# Patient Record
Sex: MALE | Race: WHITE | NOT HISPANIC OR LATINO | Employment: OTHER | URBAN - NONMETROPOLITAN AREA
[De-identification: names, ages, dates, MRNs, and addresses within clinical notes are randomized per-mention and may not be internally consistent; named-entity substitution may affect disease eponyms.]

---

## 2017-01-12 ENCOUNTER — OFFICE VISIT (OUTPATIENT)
Dept: CARDIOLOGY | Facility: CLINIC | Age: 62
End: 2017-01-12

## 2017-01-12 VITALS
DIASTOLIC BLOOD PRESSURE: 80 MMHG | HEART RATE: 92 BPM | BODY MASS INDEX: 31.64 KG/M2 | SYSTOLIC BLOOD PRESSURE: 148 MMHG | HEIGHT: 71 IN | WEIGHT: 226 LBS

## 2017-01-12 DIAGNOSIS — R55 SYNCOPE AND COLLAPSE: Primary | ICD-10-CM

## 2017-01-12 DIAGNOSIS — I10 ESSENTIAL HYPERTENSION: ICD-10-CM

## 2017-01-12 DIAGNOSIS — E78.2 MIXED HYPERLIPIDEMIA: ICD-10-CM

## 2017-01-12 PROCEDURE — 99213 OFFICE O/P EST LOW 20 MIN: CPT | Performed by: INTERNAL MEDICINE

## 2017-01-12 PROCEDURE — 93000 ELECTROCARDIOGRAM COMPLETE: CPT | Performed by: INTERNAL MEDICINE

## 2017-01-12 NOTE — MR AVS SNAPSHOT
Hema VY Gaspar   1/12/2017 3:15 PM   Office Visit    Dept Phone:  [phone]   Encounter #:  [id number]    Provider:  Angie Vogel MD   Department:  [facility] CARDIOLOGY                Your Full Care Plan              Today's Medication Changes          These changes are accurate as of: 1/12/17  3:56 PM.  If you have any questions, ask your nurse or doctor.               Medication(s)that have changed:     aspirin 81 MG EC tablet   Take 81 mg by mouth Daily.   What changed:  Another medication with the same name was removed. Continue taking this medication, and follow the directions you see here.   Changed by:  Marilou Baxter CSA         Stop taking medication(s)listed here:     fexofenadine 180 MG tablet   Commonly known as:  ALLEGRA   Stopped by:  Angie Vogel MD           MAGNESIUM OXIDE PO   Stopped by:  Vee Cornell RN                      Your Updated Medication List          This list is accurate as of: 1/12/17  3:56 PM.  Always use your most recent med list.                aspirin 81 MG EC tablet       benazepril 20 MG tablet   Commonly known as:  LOTENSIN       HM LANCETS MICRO THIN 33G misc       magnesium oxide 400 (241.3 MG) MG tablet tablet   Commonly known as:  MAGOX       metFORMIN 1000 MG tablet   Commonly known as:  GLUCOPHAGE       metoprolol succinate XL 25 MG 24 hr tablet   Commonly known as:  TOPROL-XL       midodrine 5 MG tablet   Commonly known as:  PROAMATINE               You Were Diagnosed With        Codes Comments    Syncope and collapse    -  Primary ICD-10-CM: R55  ICD-9-CM: 780.2     Essential hypertension     ICD-10-CM: I10  ICD-9-CM: 401.9     Mixed hyperlipidemia     ICD-10-CM: E78.2  ICD-9-CM: 272.2       Instructions     None    Patient Instructions History      Upcoming Appointments     Visit Type Date Time Department    OFFICE VISIT 1/12/2017  3:15 PM [facility]    OFFICE VISIT 7/13/2017   "2:45 PM [facility]      [employer] Signup     [facility] [employer] allows you to send messages to your doctor, view your test results, renew your prescriptions, schedule appointments, and more. To sign up, go to [employer] and click on the Sign Up Now link in the New User? box. Enter your [employer] Activation Code exactly as it appears below along with the last four digits of your Social Security Number and your Date of Birth () to complete the sign-up process. If you do not sign up before the expiration date, you must request a new code.    [employer] Activation Code: [email or web address]  Expires: 2017  3:56 PM    If you have questions, you can email [email or web address] or call [phone] to talk to our [employer] staff. Remember, [employer] is NOT to be used for urgent needs. For medical emergencies, dial 911.               Other Info from Your Visit           Your Appointments     2017  2:45 PM CDT   Office Visit with Angie Vogel MD   [facility] (--)    [address]  [facility] [address] [phone]           Arrive 15 minutes prior to appointment.              Allergies     Codeine        Vital Signs     Blood Pressure Pulse Height Weight Body Mass Index Smoking Status    148/80 92 71\" (180.3 cm) 226 lb (103 kg) 31.52 kg/m2 Never Smoker      Problems and Diagnoses Noted     High blood pressure    Mixed hyperlipidemia    Fainting        "

## 2017-01-13 NOTE — PROGRESS NOTES
Hema Gaspar  61 y.o. male    01/12/2017  1. Syncope and collapse    2. Essential hypertension    3. Mixed hyperlipidemia        History of Present Illness    The Cunningham is here for follow-up of his above stated problems.  From a symptom standpoint is done quite well and has not had any reoccurrence of dizziness or syncope.  He is able to perform his activities of daily living without any restrictions.  No chest pain or shortness of breath was reported.  Blood pressure was 130/80 mmHg when I checked it.  He has been compliant with his medications.  He continues to drink more than 60 ounces of fluids daily.        SUBJECTIVE    Allergies   Allergen Reactions   • Codeine          Past Medical History   Diagnosis Date   • Abnormal EKG    • Bradycardia    • Diabetes mellitus    • Hyperlipidemia    • Hypertension    • Hypotension    • Precordial pain    • Syncope and collapse          Past Surgical History   Procedure Laterality Date   • Tonsillectomy     • Vasectomy           History reviewed. No pertinent family history.      Social History     Social History   • Marital status:      Spouse name: N/A   • Number of children: N/A   • Years of education: N/A     Occupational History   • Not on file.     Social History Main Topics   • Smoking status: Never Smoker   • Smokeless tobacco: Never Used   • Alcohol use No   • Drug use: No   • Sexual activity: Defer     Other Topics Concern   • Not on file     Social History Narrative         Current Outpatient Prescriptions   Medication Sig Dispense Refill   • aspirin 81 MG EC tablet Take 81 mg by mouth Daily.     • benazepril (LOTENSIN) 20 MG tablet Take 20 mg by mouth Daily.     • HM LANCETS MICRO THIN 33G misc      • magnesium oxide (MAGOX) 400 (241.3 MG) MG tablet tablet Take 400 mg by mouth Daily.     • metFORMIN (GLUCOPHAGE) 1000 MG tablet Take 1,000 mg by mouth 2 (Two) Times a Day With Meals.     • metoprolol succinate XL (TOPROL-XL) 25 MG 24 hr tablet  "Take 25 mg by mouth Daily.     • midodrine (PROAMATINE) 5 MG tablet Take 5 mg by mouth 3 (Three) Times a Day.       No current facility-administered medications for this visit.          OBJECTIVE    Visit Vitals   • /80   • Pulse 92   • Ht 71\" (180.3 cm)   • Wt 226 lb (103 kg)   • BMI 31.52 kg/m2           Review of Systems     Constitutional:  Denies recent weight loss, weight gain, fever or chills, no change in exercise tolerance     HENT:  Denies any hearing loss, epistaxis, hoarseness, or difficulty speaking.     Eyes: Wears eyeglasses or contact lenses     Respiratory:  Denies dyspnea with exertion,no cough, wheezing, or hemoptysis.     Cardiovascular: Negative for palpations, chest pain, orthopnea, PND, peripheral edema, syncope, or claudication.     Gastrointestinal:  Denies change in bowel habits, dyspepsia, ulcer disease, hematochezia, or melena.     Endocrine: Negative for cold intolerance, heat intolerance, polydipsia, polyphagia and polyuria. Denies any history of weight change, heat/cold intolerance, polydipsia, polyuria     Genitourinary: Negative.      Musculoskeletal: Denies any history of arthritic symptoms or back problems     Skin:  Denies any change in hair or nails, rashes, or skin lesions.     Allergic/Immunologic: Negative.  Negative for environmental allergies, food allergies and immunocompromised state.     Neurological:  Denies any history of recurrent headaches, strokes, TIA, or seizure disorder.     Hematological: Denies any food allergies, seasonal allergies, bleeding disorders, or lymphadenopathy.     Psychiatric/Behavioral: Denies any history of depression, substance abuse, or change in cognitive function.         Physical Exam     Constitutional: Cooperative, alert and oriented, well-developed, well-nourished, in no acute distress.     HENT:   Head: Normocephalic, normal hair patterns, no masses or tenderness.  Ears, Nose, and Throat: No gross abnormalities. No pallor or " cyanosis. Dentition good.   Eyes: EOMS intact, PERRL, conjunctivae and lids unremarkable. Fundoscopic exam and visual fields not performed.   Neck: No palpable masses or adenopathy, no thyromegaly, no JVD, carotid pulses are full and equal bilaterally and without  Bruits.     Cardiovascular: Regular rhythm, S1 and S2 normal, no S3 or S4. Apical impulse not displaced. No murmurs, gallops, or rubs detected.     Pulmonary/Chest: Chest: normal symmetry, no tenderness to palpation, normal respiratory excursion, no intercostal retraction, no use of accessory muscles.            Pulmonary: Normal breath sounds. No rales or ronchi.    Abdominal: Abdomen soft, bowel sounds normoactive, no masses, no hepatosplenomegaly, non-tender, no bruits.     Musculoskeletal: No deformities, clubbing, cyanosis, erythema, or edema observed. There are no spinal abnormalities noted. Normal muscle strength and tone. Pulses full and equal in all extremities, no bruits auscultated.     Neurological: No gross motor or sensory deficits noted, affect appropriate, oriented to time, person, place.     Skin: Warm and dry to the touch, no apparent skin lesions or masses noted.     Psychiatric: He has a normal mood and affect. His behavior is normal. Judgment and thought content normal.       Below is the interpretation for EKG done on 1/12/2017.    ECG 12 Lead  Date/Time: 1/14/2017 11:28 AM  Performed by: RAINE ROBLES  Authorized by: RAINE ROBLES   Comparison: not compared with previous ECG   Rhythm: sinus rhythm  Rate: normal  Conduction: right bundle branch block  QRS axis: normal  Comments: EKG showed sinus rhythm heart rate of 93 bpm.  Right bundle branch block was noted.  Nonspecific ST-T changes were noted.  QRS duration was 126 ms and QTc interval 447 ms.  AK interval was 182 ms.  KG was performed on 1/12/2017              Lab Results   Component Value Date    WBC 9.2 01/02/2017    HGB 15.0 01/02/2017    HCT 43.1  01/02/2017    MCV 84.0 01/02/2017     01/02/2017     Lab Results   Component Value Date    GLUCOSE 155 (H) 01/02/2017    BUN 15 01/02/2017    CREATININE 0.8 01/02/2017    CO2 28 01/02/2017    CALCIUM 9.2 01/02/2017    ALBUMIN 4.3 01/02/2017    AST 29 01/02/2017    ALT 49 01/02/2017     No results found for: CHOL  No results found for: TRIG  No results found for: HDL  No results found for: LDLCALC  No results found for: LDL  No results found for: HDLLDLRATIO  No components found for: CHOLHDL  No results found for: HGBA1C  Lab Results   Component Value Date    TSH 2.17 01/02/2017           ASSESSMENT AND PLAN  Mr. Gaspar is clinically stable with no reoccurrence of dizziness or syncope.  I've continued his antiplatelet therapy with aspirin antihypertensive therapy with benazepril and Toprol-XL and midodrine has been continued.  Mr. Gaspar has not had any dizzy spells or syncopal episodes for almost a year now and I believe that he will be able to drive safely.    Diagnoses and all orders for this visit:    Syncope and collapse    Essential hypertension    Mixed hyperlipidemia        Angie Vogel MD  1/13/2017  2:26 PM

## 2017-07-13 ENCOUNTER — OFFICE VISIT (OUTPATIENT)
Dept: CARDIOLOGY | Facility: CLINIC | Age: 62
End: 2017-07-13

## 2017-07-13 VITALS
HEART RATE: 80 BPM | SYSTOLIC BLOOD PRESSURE: 130 MMHG | WEIGHT: 230 LBS | HEIGHT: 72 IN | BODY MASS INDEX: 31.15 KG/M2 | DIASTOLIC BLOOD PRESSURE: 80 MMHG

## 2017-07-13 DIAGNOSIS — R55 SYNCOPE AND COLLAPSE: Primary | ICD-10-CM

## 2017-07-13 DIAGNOSIS — E78.2 MIXED HYPERLIPIDEMIA: ICD-10-CM

## 2017-07-13 DIAGNOSIS — R00.1 BRADYCARDIA: ICD-10-CM

## 2017-07-13 DIAGNOSIS — I10 ESSENTIAL HYPERTENSION: ICD-10-CM

## 2017-07-13 PROCEDURE — 99213 OFFICE O/P EST LOW 20 MIN: CPT | Performed by: INTERNAL MEDICINE

## 2017-07-13 RX ORDER — MELOXICAM 15 MG/1
15 TABLET ORAL
COMMUNITY
Start: 2017-06-19 | End: 2018-06-15

## 2017-07-13 NOTE — PROGRESS NOTES
Hema Gaspar  61 y.o. male    07/13/2017  1. Syncope and collapse    2. Bradycardia    3. Essential hypertension    4. Mixed hyperlipidemia        History of Present Illness    Mr. Gaspar is here for follow-up of his above stated problems.  From a symptom standpoint he is done well and denied any chest pain, shortness of breath, palpitation, dizziness or syncope.  He has been drinking more than a liter of water every day.  Blood pressure was in the normal range.  He had lab work done by  yesterday and LDL was 108 and HDL was 53.  Look lites within the normal range and BUNs was 22.  I have encouraged him to continue drinking large amount of fluids.        SUBJECTIVE    Allergies   Allergen Reactions   • Codeine          Past Medical History:   Diagnosis Date   • Abnormal EKG    • Bradycardia    • Diabetes mellitus    • Hyperlipidemia    • Hypertension    • Hypotension    • Precordial pain    • Syncope and collapse          Past Surgical History:   Procedure Laterality Date   • TONSILLECTOMY     • VASECTOMY           No family history on file.      Social History     Social History   • Marital status:      Spouse name: N/A   • Number of children: N/A   • Years of education: N/A     Occupational History   • Not on file.     Social History Main Topics   • Smoking status: Never Smoker   • Smokeless tobacco: Never Used   • Alcohol use No   • Drug use: No   • Sexual activity: Defer     Other Topics Concern   • Not on file     Social History Narrative         Current Outpatient Prescriptions   Medication Sig Dispense Refill   • meloxicam (MOBIC) 15 MG tablet Take 15 mg by mouth.     • aspirin 81 MG EC tablet Take 81 mg by mouth Daily.     • benazepril (LOTENSIN) 20 MG tablet Take 20 mg by mouth Daily.     • HM LANCETS MICRO THIN 33G misc      • magnesium oxide (MAGOX) 400 (241.3 MG) MG tablet tablet Take 400 mg by mouth Daily.     • metFORMIN (GLUCOPHAGE) 1000 MG tablet Take 1,000 mg by mouth 2  "(Two) Times a Day With Meals.     • metoprolol succinate XL (TOPROL-XL) 25 MG 24 hr tablet Take 25 mg by mouth Daily.     • midodrine (PROAMATINE) 5 MG tablet Take 5 mg by mouth 3 (Three) Times a Day.       No current facility-administered medications for this visit.          OBJECTIVE    /80  Pulse 80  Ht 72\" (182.9 cm)  Wt 230 lb (104 kg)  BMI 31.19 kg/m2        Review of Systems     Constitutional:  Denies recent weight loss, weight gain, fever or chills, no change in exercise tolerance     HENT:  Denies any hearing loss, epistaxis, hoarseness, or difficulty speaking.     Eyes: Wears eyeglasses or contact lenses     Respiratory:  Denies dyspnea with exertion,no cough, wheezing, or hemoptysis.     Cardiovascular: Negative for palpations, chest pain, orthopnea, PND, peripheral edema, syncope, or claudication.     Gastrointestinal:  Denies change in bowel habits, dyspepsia, ulcer disease, hematochezia, or melena.     Endocrine: Negative for cold intolerance, heat intolerance, polydipsia, polyphagia and polyuria. Denies any history of weight change, heat/cold intolerance, polydipsia, polyuria     Genitourinary: Negative.      Musculoskeletal: Denies any history of arthritic symptoms or back problems     Skin:  Denies any change in hair or nails, rashes, or skin lesions.     Allergic/Immunologic: Negative.  Negative for environmental allergies, food allergies and immunocompromised state.     Neurological:  Denies any history of recurrent headaches, strokes, TIA, or seizure disorder.     Hematological: Denies any food allergies, seasonal allergies, bleeding disorders, or lymphadenopathy.     Psychiatric/Behavioral: Denies any history of depression, substance abuse, or change in cognitive function.         Physical Exam     Constitutional: Cooperative, alert and oriented, well-developed, well-nourished, in no acute distress.     HENT:   Head: Normocephalic, normal hair patterns, no masses or tenderness.  Ears, " Nose, and Throat: No gross abnormalities. No pallor or cyanosis.   Eyes: EOMS intact, PERRL, conjunctivae and lids unremarkable. Fundoscopic exam and visual fields not performed.   Neck: No palpable masses or adenopathy, no thyromegaly, no JVD, carotid pulses are full and equal bilaterally and without  Bruits.     Cardiovascular: Regular rhythm, S1 and S2 normal, no S3 or S4. Apical impulse not displaced. No murmurs, gallops, or rubs detected.     Pulmonary/Chest: Chest: normal symmetry, no tenderness to palpation, normal respiratory excursion, no intercostal retraction, no use of accessory muscles.            Pulmonary: Normal breath sounds. No rales or ronchi.    Abdominal: Abdomen soft, bowel sounds normoactive, no masses, no hepatosplenomegaly, non-tender, no bruits.     Musculoskeletal: No deformities, clubbing, cyanosis, erythema, or edema observed.    Neurological: No gross motor or sensory deficits noted, affect appropriate, oriented to time, person, place.     Skin: Warm and dry to the touch, no apparent skin lesions or masses noted.     Psychiatric: He has a normal mood and affect. His behavior is normal. Judgment and thought content normal.         Procedures      Lab Results   Component Value Date    WBC 9.2 01/02/2017    HGB 15.0 01/02/2017    HCT 43.1 01/02/2017    MCV 84.0 01/02/2017     01/02/2017     Lab Results   Component Value Date    GLUCOSE 155 (H) 01/02/2017    BUN 15 01/02/2017    CREATININE 0.8 01/02/2017    CO2 28 01/02/2017    CALCIUM 9.2 01/02/2017    ALBUMIN 4.3 01/02/2017    AST 29 01/02/2017    ALT 49 01/02/2017     No results found for: CHOL  No results found for: TRIG  No results found for: HDL  No results found for: LDLCALC  No results found for: LDL  No results found for: HDLLDLRATIO  No components found for: CHOLHDL  No results found for: HGBA1C  Lab Results   Component Value Date    TSH 2.17 01/02/2017           ASSESSMENT AND PLAN  Mr. Gaspar is stable with no  evidence of angina, arrhythmia or congestive heart failure.  Antiplatelet therapy with aspirin, antihypertensive therapy with benazepril and metoprolol XL and Midorine 5 mg 3 times a day has been continued.    Diagnoses and all orders for this visit:    Syncope and collapse    Bradycardia    Essential hypertension    Mixed hyperlipidemia        Angie Vogel MD  7/13/2017  3:01 PM

## 2017-08-09 RX ORDER — MIDODRINE HYDROCHLORIDE 5 MG/1
5 TABLET ORAL 3 TIMES DAILY
Qty: 90 TABLET | Refills: 6 | Status: SHIPPED | OUTPATIENT
Start: 2017-08-09 | End: 2017-12-20 | Stop reason: SDUPTHER

## 2017-08-09 NOTE — TELEPHONE ENCOUNTER
Refill Request came in by fax from   [facility]/pharmacy #[id number] - [place], [place] - [address] [place] [address] - [phone]  - [phone] [address] [address]  Phone: [phone] Fax: [phone]   Refill sent to pharmacy via e-scribe.

## 2017-10-20 RX ORDER — METOPROLOL SUCCINATE 25 MG/1
25 TABLET, EXTENDED RELEASE ORAL DAILY
Qty: 90 TABLET | Refills: 1 | Status: SHIPPED | OUTPATIENT
Start: 2017-10-20 | End: 2018-01-19 | Stop reason: SDUPTHER

## 2017-12-20 RX ORDER — MIDODRINE HYDROCHLORIDE 5 MG/1
5 TABLET ORAL 3 TIMES DAILY
Qty: 90 TABLET | Refills: 6 | Status: SHIPPED | OUTPATIENT
Start: 2017-12-20 | End: 2018-07-26 | Stop reason: SDUPTHER

## 2017-12-20 NOTE — TELEPHONE ENCOUNTER
Refill Request came in by fax from   [facility]/pharmacy #[id number] - [place], [place] - [address] [place] [address] - [phone]  - [phone] [address] [address]  Phone: [phone] Fax: [phone]   Refill sent to pharmacy via e-scribe.

## 2018-01-19 ENCOUNTER — OFFICE VISIT (OUTPATIENT)
Dept: CARDIOLOGY | Facility: CLINIC | Age: 63
End: 2018-01-19

## 2018-01-19 VITALS
HEIGHT: 71 IN | WEIGHT: 227 LBS | SYSTOLIC BLOOD PRESSURE: 146 MMHG | BODY MASS INDEX: 31.78 KG/M2 | HEART RATE: 76 BPM | DIASTOLIC BLOOD PRESSURE: 84 MMHG

## 2018-01-19 DIAGNOSIS — R55 SYNCOPE AND COLLAPSE: ICD-10-CM

## 2018-01-19 DIAGNOSIS — E78.2 MIXED HYPERLIPIDEMIA: ICD-10-CM

## 2018-01-19 DIAGNOSIS — I10 ESSENTIAL HYPERTENSION: Primary | ICD-10-CM

## 2018-01-19 PROCEDURE — 99213 OFFICE O/P EST LOW 20 MIN: CPT | Performed by: INTERNAL MEDICINE

## 2018-01-19 RX ORDER — METOPROLOL SUCCINATE 25 MG/1
25 TABLET, EXTENDED RELEASE ORAL DAILY
Qty: 90 TABLET | Refills: 1 | Status: SHIPPED | OUTPATIENT
Start: 2018-01-19 | End: 2021-11-15 | Stop reason: SDUPTHER

## 2018-01-19 NOTE — PROGRESS NOTES
Hema Gaspar  62 y.o. male    01/19/2018  1. Essential hypertension    2. Syncope and collapse    3. Mixed hyperlipidemia        History of Present Illness    Mr. 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  His done well from a symptom standpoint with no reoccurrence of dizziness or syncope.  His been compliant with his medications.  He denied any cardiac symptoms.  His lipid profiles rechecked by Dr. aTylor and LDL was 107.        SUBJECTIVE    Allergies   Allergen Reactions   • Codeine          Past Medical History:   Diagnosis Date   • Abnormal EKG    • Bradycardia    • Diabetes mellitus    • Hyperlipidemia    • Hypertension    • Hypotension    • Precordial pain    • Syncope and collapse          Past Surgical History:   Procedure Laterality Date   • TONSILLECTOMY     • VASECTOMY           Family History   Problem Relation Age of Onset   • No Known Problems Mother    • Heart disease Father          Social History     Social History   • Marital status:      Spouse name: N/A   • Number of children: N/A   • Years of education: N/A     Occupational History   • Not on file.     Social History Main Topics   • Smoking status: Never Smoker   • Smokeless tobacco: Never Used   • Alcohol use No   • Drug use: No   • Sexual activity: Defer     Other Topics Concern   • Not on file     Social History Narrative         Current Outpatient Prescriptions   Medication Sig Dispense Refill   • aspirin 81 MG EC tablet Take 81 mg by mouth Daily.     • benazepril (LOTENSIN) 20 MG tablet Take 20 mg by mouth Daily.     • HM LANCETS MICRO THIN 33G misc      • magnesium oxide (MAGOX) 400 (241.3 MG) MG tablet tablet Take 400 mg by mouth Daily.     • meloxicam (MOBIC) 15 MG tablet Take 15 mg by mouth.     • metFORMIN (GLUCOPHAGE) 1000 MG tablet Take 1,000 mg by mouth 2 (Two) Times a Day With Meals.     • metoprolol succinate XL (TOPROL-XL) 25 MG 24 hr tablet Take 1 tablet by mouth Daily. 90 tablet  "1   • midodrine (PROAMATINE) 5 MG tablet Take 1 tablet by mouth 3 (Three) Times a Day. 90 tablet 6     No current facility-administered medications for this visit.          OBJECTIVE    /84  Pulse 76  Ht 180.3 cm (71\")  Wt 103 kg (227 lb)  BMI 31.66 kg/m2        Review of Systems     Constitutional:  Denies recent weight loss, weight gain, fever or chills, no change in exercise tolerance     HENT:  Denies any hearing loss, epistaxis, hoarseness, or difficulty speaking.     Eyes: Wears eyeglasses or contact lenses     Respiratory:  Denies dyspnea with exertion,no cough, wheezing, or hemoptysis.     Cardiovascular: Negative for palpations, chest pain, orthopnea, PND, peripheral edema, syncope, or claudication.     Gastrointestinal:  Denies change in bowel habits, dyspepsia, ulcer disease, hematochezia, or melena.     Endocrine: Negative for cold intolerance, heat intolerance, polydipsia, polyphagia and polyuria. Denies any history of weight change, heat/cold intolerance, polydipsia, polyuria     Genitourinary: Negative.      Musculoskeletal: Denies any history of arthritic symptoms or back problems     Skin:  Denies any change in hair or nails, rashes, or skin lesions.     Allergic/Immunologic: Negative.  Negative for environmental allergies, food allergies and immunocompromised state.     Neurological:  Denies any history of recurrent headaches, strokes, TIA, or seizure disorder.     Hematological: Denies any food allergies, seasonal allergies, bleeding disorders, or lymphadenopathy.     Psychiatric/Behavioral: Denies any history of depression, substance abuse, or change in cognitive function.         Physical Exam     Constitutional: Cooperative, alert and oriented, well-developed, well-nourished, in no acute distress.     HENT:   Head: Normocephalic, normal hair patterns, no masses or tenderness.  Ears, Nose, and Throat: No gross abnormalities. No pallor or cyanosis. Dentition good.   Eyes: EOMS intact, " PERRL, conjunctivae and lids unremarkable. Fundoscopic exam and visual fields not performed.   Neck: No palpable masses or adenopathy, no thyromegaly, no JVD, carotid pulses are full and equal bilaterally and without  Bruits.     Cardiovascular: Regular rhythm, S1 and S2 normal, no S3 or S4. Apical impulse not displaced. No murmurs, gallops, or rubs detected.     Pulmonary/Chest: Chest: normal symmetry, no tenderness to palpation, normal respiratory excursion, no intercostal retraction, no use of accessory muscles.            Pulmonary: Normal breath sounds. No rales or ronchi.    Abdominal: Abdomen soft, bowel sounds normoactive, no masses, no hepatosplenomegaly, non-tender, no bruits.     Musculoskeletal: No deformities, clubbing, cyanosis, erythema, or edema observed. There are no spinal abnormalities noted. Normal muscle strength and tone. Pulses full and equal in all extremities, no bruits auscultated.     Neurological: No gross motor or sensory deficits noted, affect appropriate, oriented to time, person, place.     Skin: Warm and dry to the touch, no apparent skin lesions or masses noted.     Psychiatric: He has a normal mood and affect. His behavior is normal. Judgment and thought content normal.         Procedures      Lab Results   Component Value Date    WBC 9.2 01/02/2017    HGB 15.0 01/02/2017    HCT 43.1 01/02/2017    MCV 84.0 01/02/2017     01/02/2017     Lab Results   Component Value Date    GLUCOSE 155 (H) 01/02/2017    BUN 15 01/02/2017    CREATININE 0.8 01/02/2017    CO2 28 01/02/2017    CALCIUM 9.2 01/02/2017    ALBUMIN 4.3 01/02/2017    AST 29 01/02/2017    ALT 49 01/02/2017     No results found for: CHOL  No results found for: TRIG  No results found for: HDL  No results found for: LDLCALC  No results found for: LDL  No results found for: HDLLDLRATIO  No components found for: CHOLHDL  No results found for: HGBA1C  Lab Results   Component Value Date    TSH 2.17 01/02/2017            ASSESSMENT AND PLAN    Mr. Gaspar is stable with no evidence of dizziness or syncope.  Antiplatelet therapy with aspirin, antihypertensive therapy with benazepril and low-dose metoprolol succinate and mid midodrine have been continued.    Hema was seen today for follow-up.    Diagnoses and all orders for this visit:    Essential hypertension    Syncope and collapse    Mixed hyperlipidemia        Angie Vogel MD  1/19/2018  8:19 AM

## 2018-02-21 ENCOUNTER — HOSPITAL ENCOUNTER (EMERGENCY)
Facility: HOSPITAL | Age: 63
Discharge: HOME | End: 2018-02-21
Attending: FAMILY MEDICINE | Admitting: FAMILY MEDICINE

## 2018-02-21 ENCOUNTER — APPOINTMENT (OUTPATIENT)
Dept: CT IMAGING | Facility: HOSPITAL | Age: 63
End: 2018-02-21

## 2018-02-21 VITALS
RESPIRATION RATE: 18 BRPM | TEMPERATURE: 97.6 F | HEIGHT: 72 IN | BODY MASS INDEX: 28.99 KG/M2 | SYSTOLIC BLOOD PRESSURE: 119 MMHG | WEIGHT: 214 LBS | DIASTOLIC BLOOD PRESSURE: 72 MMHG | OXYGEN SATURATION: 96 % | HEART RATE: 72 BPM

## 2018-02-21 DIAGNOSIS — R10.32 LEFT LOWER QUADRANT PAIN: Primary | ICD-10-CM

## 2018-02-21 LAB
ALBUMIN SERPL-MCNC: 4.5 G/DL (ref 3.4–4.8)
ALBUMIN/GLOB SERPL: 1.6 G/DL (ref 1.1–1.8)
ALP SERPL-CCNC: 39 U/L (ref 38–126)
ALT SERPL W P-5'-P-CCNC: 39 U/L (ref 21–72)
AMYLASE SERPL-CCNC: 61 U/L (ref 50–130)
ANION GAP SERPL CALCULATED.3IONS-SCNC: 14 MMOL/L (ref 5–15)
AST SERPL-CCNC: 30 U/L (ref 17–59)
BASOPHILS # BLD AUTO: 0.02 10*3/MM3 (ref 0–0.2)
BASOPHILS NFR BLD AUTO: 0.2 % (ref 0–2)
BILIRUB SERPL-MCNC: 0.6 MG/DL (ref 0.2–1.3)
BILIRUB UR QL STRIP: NEGATIVE
BUN BLD-MCNC: 18 MG/DL (ref 7–21)
BUN/CREAT SERPL: 20 (ref 7–25)
CALCIUM SPEC-SCNC: 9.2 MG/DL (ref 8.4–10.2)
CHLORIDE SERPL-SCNC: 102 MMOL/L (ref 95–110)
CLARITY UR: ABNORMAL
CO2 SERPL-SCNC: 25 MMOL/L (ref 22–31)
COLOR UR: YELLOW
CREAT BLD-MCNC: 0.9 MG/DL (ref 0.7–1.3)
DEPRECATED RDW RBC AUTO: 40.8 FL (ref 35.1–43.9)
EOSINOPHIL # BLD AUTO: 0.12 10*3/MM3 (ref 0–0.7)
EOSINOPHIL NFR BLD AUTO: 1.4 % (ref 0–7)
ERYTHROCYTE [DISTWIDTH] IN BLOOD BY AUTOMATED COUNT: 13.5 % (ref 11.5–14.5)
GFR SERPL CREATININE-BSD FRML MDRD: 86 ML/MIN/1.73 (ref 60–113)
GLOBULIN UR ELPH-MCNC: 2.9 GM/DL (ref 2.3–3.5)
GLUCOSE BLD-MCNC: 153 MG/DL (ref 60–100)
GLUCOSE UR STRIP-MCNC: NEGATIVE MG/DL
HCT VFR BLD AUTO: 38.2 % (ref 39–49)
HGB BLD-MCNC: 13.2 G/DL (ref 13.7–17.3)
HGB UR QL STRIP.AUTO: NEGATIVE
IMM GRANULOCYTES # BLD: 0.05 10*3/MM3 (ref 0–0.02)
IMM GRANULOCYTES NFR BLD: 0.6 % (ref 0–0.5)
KETONES UR QL STRIP: ABNORMAL
LEUKOCYTE ESTERASE UR QL STRIP.AUTO: NEGATIVE
LIPASE SERPL-CCNC: 43 U/L (ref 23–300)
LYMPHOCYTES # BLD AUTO: 1.61 10*3/MM3 (ref 0.6–4.2)
LYMPHOCYTES NFR BLD AUTO: 18.8 % (ref 10–50)
MCH RBC QN AUTO: 29.2 PG (ref 26.5–34)
MCHC RBC AUTO-ENTMCNC: 34.6 G/DL (ref 31.5–36.3)
MCV RBC AUTO: 84.5 FL (ref 80–98)
MONOCYTES # BLD AUTO: 0.81 10*3/MM3 (ref 0–0.9)
MONOCYTES NFR BLD AUTO: 9.5 % (ref 0–12)
NEUTROPHILS # BLD AUTO: 5.94 10*3/MM3 (ref 2–8.6)
NEUTROPHILS NFR BLD AUTO: 69.5 % (ref 37–80)
NITRITE UR QL STRIP: NEGATIVE
PH UR STRIP.AUTO: 5.5 [PH] (ref 5–9)
PLATELET # BLD AUTO: 183 10*3/MM3 (ref 150–450)
PMV BLD AUTO: 10 FL (ref 8–12)
POTASSIUM BLD-SCNC: 3.9 MMOL/L (ref 3.5–5.1)
PROT SERPL-MCNC: 7.4 G/DL (ref 6.3–8.6)
PROT UR QL STRIP: NEGATIVE
RBC # BLD AUTO: 4.52 10*6/MM3 (ref 4.37–5.74)
SODIUM BLD-SCNC: 141 MMOL/L (ref 137–145)
SP GR UR STRIP: 1.02 (ref 1–1.03)
UROBILINOGEN UR QL STRIP: ABNORMAL
WBC NRBC COR # BLD: 8.55 10*3/MM3 (ref 3.2–9.8)

## 2018-02-21 PROCEDURE — 80053 COMPREHEN METABOLIC PANEL: CPT | Performed by: FAMILY MEDICINE

## 2018-02-21 PROCEDURE — 74177 CT ABD & PELVIS W/CONTRAST: CPT

## 2018-02-21 PROCEDURE — 81003 URINALYSIS AUTO W/O SCOPE: CPT | Performed by: FAMILY MEDICINE

## 2018-02-21 PROCEDURE — 0 DIATRIZOATE MEGLUMINE & SODIUM PER 1 ML: Performed by: FAMILY MEDICINE

## 2018-02-21 PROCEDURE — 25010000002 ONDANSETRON PER 1 MG: Performed by: FAMILY MEDICINE

## 2018-02-21 PROCEDURE — 96361 HYDRATE IV INFUSION ADD-ON: CPT

## 2018-02-21 PROCEDURE — 83690 ASSAY OF LIPASE: CPT | Performed by: FAMILY MEDICINE

## 2018-02-21 PROCEDURE — 96375 TX/PRO/DX INJ NEW DRUG ADDON: CPT

## 2018-02-21 PROCEDURE — 96374 THER/PROPH/DIAG INJ IV PUSH: CPT

## 2018-02-21 PROCEDURE — 0 IOPAMIDOL 61 % SOLUTION: Performed by: FAMILY MEDICINE

## 2018-02-21 PROCEDURE — 82150 ASSAY OF AMYLASE: CPT | Performed by: FAMILY MEDICINE

## 2018-02-21 PROCEDURE — 25010000002 MORPHINE PER 10 MG: Performed by: FAMILY MEDICINE

## 2018-02-21 PROCEDURE — 99284 EMERGENCY DEPT VISIT MOD MDM: CPT

## 2018-02-21 PROCEDURE — 85025 COMPLETE CBC W/AUTO DIFF WBC: CPT | Performed by: FAMILY MEDICINE

## 2018-02-21 RX ORDER — HYDROCODONE BITARTRATE AND ACETAMINOPHEN 7.5; 325 MG/1; MG/1
1 TABLET ORAL EVERY 6 HOURS PRN
Qty: 15 TABLET | Refills: 0 | Status: SHIPPED | OUTPATIENT
Start: 2018-02-21 | End: 2018-07-20

## 2018-02-21 RX ORDER — SODIUM CHLORIDE 9 MG/ML
125 INJECTION, SOLUTION INTRAVENOUS CONTINUOUS
Status: DISCONTINUED | OUTPATIENT
Start: 2018-02-21 | End: 2018-02-21 | Stop reason: HOSPADM

## 2018-02-21 RX ORDER — SODIUM CHLORIDE 9 MG/ML
1000 INJECTION, SOLUTION INTRAVENOUS ONCE
Status: COMPLETED | OUTPATIENT
Start: 2018-02-21 | End: 2018-02-21

## 2018-02-21 RX ORDER — ONDANSETRON 2 MG/ML
4 INJECTION INTRAMUSCULAR; INTRAVENOUS ONCE
Status: COMPLETED | OUTPATIENT
Start: 2018-02-21 | End: 2018-02-21

## 2018-02-21 RX ORDER — ONDANSETRON 4 MG/1
4 TABLET, ORALLY DISINTEGRATING ORAL EVERY 6 HOURS PRN
Qty: 10 TABLET | Refills: 0 | Status: SHIPPED | OUTPATIENT
Start: 2018-02-21 | End: 2018-07-20

## 2018-02-21 RX ADMIN — IOPAMIDOL 94 ML: 612 INJECTION, SOLUTION INTRAVENOUS at 05:19

## 2018-02-21 RX ADMIN — SODIUM CHLORIDE 1000 ML: 9 INJECTION, SOLUTION INTRAVENOUS at 03:16

## 2018-02-21 RX ADMIN — DIATRIZOATE MEGLUMINE AND DIATRIZOATE SODIUM 40 ML: 660; 100 LIQUID ORAL; RECTAL at 05:19

## 2018-02-21 RX ADMIN — MORPHINE SULFATE 4 MG: 4 INJECTION, SOLUTION INTRAMUSCULAR; INTRAVENOUS at 03:16

## 2018-02-21 RX ADMIN — ONDANSETRON 4 MG: 2 INJECTION INTRAMUSCULAR; INTRAVENOUS at 03:16

## 2018-02-21 NOTE — DISCHARGE INSTRUCTIONS
Abdominal Pain, Adult  Abdominal pain can be caused by many things. Often, abdominal pain is not serious and it gets better with no treatment or by being treated at home. However, sometimes abdominal pain is serious. Your health care provider will do a medical history and a physical exam to try to determine the cause of your abdominal pain.  Follow these instructions at home:  · Take over-the-counter and prescription medicines only as told by your health care provider. Do not take a laxative unless told by your health care provider.  · Drink enough fluid to keep your urine clear or pale yellow.  · Watch your condition for any changes.  · Keep all follow-up visits as told by your health care provider. This is important.  Contact a health care provider if:  · Your abdominal pain changes or gets worse.  · You are not hungry or you lose weight without trying.  · You are constipated or have diarrhea for more than 2-3 days.  · You have pain when you urinate or have a bowel movement.  · Your abdominal pain wakes you up at night.  · Your pain gets worse with meals, after eating, or with certain foods.  · You are throwing up and cannot keep anything down.  · You have a fever.  Get help right away if:  · Your pain does not go away as soon as your health care provider told you to expect.  · You cannot stop throwing up.  · Your pain is only in areas of the abdomen, such as the right side or the left lower portion of the abdomen.  · You have bloody or black stools, or stools that look like tar.  · You have severe pain, cramping, or bloating in your abdomen.  · You have signs of dehydration, such as:  ¨ Dark urine, very little urine, or no urine.  ¨ Cracked lips.  ¨ Dry mouth.  ¨ Sunken eyes.  ¨ Sleepiness.  ¨ Weakness.  This information is not intended to replace advice given to you by your health care provider. Make sure you discuss any questions you have with your health care provider.  Document Released: 09/27/2006 Document  Revised: 07/07/2017 Document Reviewed: 05/31/2017  [email or web address] Interactive Patient Education © 2017 [employer]

## 2018-02-21 NOTE — ED PROVIDER NOTES
Subjective   Patient is a 62 y.o. male presenting with abdominal pain.   Abdominal Pain   Pain location:  LLQ  Pain quality: cramping    Pain radiates to:  Does not radiate  Pain severity:  Moderate  Onset quality:  Sudden  Duration:  3 hours  Timing:  Intermittent  Progression:  Worsening  Chronicity:  New  Context: not alcohol use, not sick contacts, not suspicious food intake and not trauma    Relieved by:  Nothing  Worsened by:  Nothing  Ineffective treatments:  None tried  Associated symptoms: dysuria and nausea    Associated symptoms: no anorexia, no belching, no chest pain, no chills, no constipation, no cough, no diarrhea, no fatigue, no fever, no shortness of breath, no sore throat and no vomiting    Risk factors: obesity        Review of Systems   Constitutional: Negative for appetite change, chills, diaphoresis, fatigue and fever.   HENT: Negative for congestion, ear discharge, ear pain, nosebleeds, rhinorrhea, sinus pressure, sore throat and trouble swallowing.    Eyes: Negative for discharge and redness.   Respiratory: Negative for apnea, cough, chest tightness, shortness of breath and wheezing.    Cardiovascular: Negative for chest pain.   Gastrointestinal: Positive for abdominal pain and nausea. Negative for anorexia, constipation, diarrhea and vomiting.   Endocrine: Negative for polyuria.   Genitourinary: Positive for dysuria. Negative for frequency and urgency.   Musculoskeletal: Negative for myalgias and neck pain.   Skin: Negative for color change and rash.   Allergic/Immunologic: Negative for immunocompromised state.   Neurological: Negative for dizziness, seizures, syncope, weakness, light-headedness and headaches.   Hematological: Negative for adenopathy. Does not bruise/bleed easily.   Psychiatric/Behavioral: Negative for behavioral problems and confusion.   All other systems reviewed and are negative.      Past Medical History:   Diagnosis Date   • Abnormal EKG    • Bradycardia    • Diabetes  mellitus    • Herpes zoster dermatitis of eyelids     OD, no globe involvement      • Hyperlipidemia    • Hypertension    • Hypotension    • Precordial pain    • Syncope and collapse        Allergies   Allergen Reactions   • Codeine        Past Surgical History:   Procedure Laterality Date   • TONSILLECTOMY     • VASECTOMY         Family History   Problem Relation Age of Onset   • No Known Problems Mother    • Heart disease Father        Social History     Social History   • Marital status:      Spouse name: N/A   • Number of children: N/A   • Years of education: N/A     Social History Main Topics   • Smoking status: Never Smoker   • Smokeless tobacco: Never Used   • Alcohol use No   • Drug use: No   • Sexual activity: Defer     Other Topics Concern   • None     Social History Narrative           Objective   Physical Exam   Constitutional: He is oriented to person, place, and time. He appears well-developed and well-nourished.   HENT:   Head: Normocephalic and atraumatic.   Nose: Nose normal.   Mouth/Throat: Oropharynx is clear and moist.   Eyes: Conjunctivae and EOM are normal. Pupils are equal, round, and reactive to light. Right eye exhibits no discharge. Left eye exhibits no discharge. No scleral icterus.   Neck: Normal range of motion. Neck supple. No tracheal deviation present.   Cardiovascular: Normal rate, regular rhythm and normal heart sounds.    No murmur heard.  Pulmonary/Chest: Effort normal and breath sounds normal. No stridor. No respiratory distress. He has no wheezes. He has no rales.   Abdominal: Soft. Bowel sounds are normal. He exhibits no distension and no mass. There is tenderness in the left lower quadrant. There is no rebound and no guarding.   Musculoskeletal: He exhibits no edema.   Neurological: He is alert and oriented to person, place, and time. Coordination normal.   Skin: Skin is warm and dry. No rash noted. No erythema.   Psychiatric: He has a normal mood and affect. His  behavior is normal. Thought content normal.   Nursing note and vitals reviewed.      Procedures         ED Course  ED Course        Labs Reviewed   COMPREHENSIVE METABOLIC PANEL - Abnormal; Notable for the following:        Result Value    Glucose 153 (*)     All other components within normal limits   URINALYSIS W/ CULTURE IF INDICATED - Abnormal; Notable for the following:     Appearance, UA Cloudy (*)     Ketones, UA Trace (*)     All other components within normal limits    Narrative:     Urine microscopic not indicated.   CBC WITH AUTO DIFFERENTIAL - Abnormal; Notable for the following:     Hemoglobin 13.2 (*)     Hematocrit 38.2 (*)     Immature Grans % 0.6 (*)     Immature Grans, Absolute 0.05 (*)     All other components within normal limits   AMYLASE - Normal   LIPASE - Normal   CBC AND DIFFERENTIAL    Narrative:     The following orders were created for panel order CBC & Differential.  Procedure                               Abnormality         Status                     ---------                               -----------         ------                     CBC Auto Differential[[id number]]        Abnormal            Final result                 Please view results for these tests on the individual orders.       CT Abdomen Pelvis With Contrast   Final Result   1. No obvious acute abnormality   2. Diverticulosis   3. Fatty liver      Electronically signed by:  wDain Small MD  2/21/2018 5:51 AM   CST Workstation: Teachable          Request # : [id number]            [facility]    Final diagnoses:   Left lower quadrant pain            Norman Avina MD  02/21/18 0610

## 2018-04-10 ENCOUNTER — HOSPITAL ENCOUNTER (OUTPATIENT)
Facility: HOSPITAL | Age: 63
Setting detail: HOSPITAL OUTPATIENT SURGERY
Discharge: HOME | End: 2018-04-10
Attending: INTERNAL MEDICINE | Admitting: INTERNAL MEDICINE

## 2018-04-10 ENCOUNTER — ANESTHESIA (OUTPATIENT)
Dept: GASTROENTEROLOGY | Facility: HOSPITAL | Age: 63
End: 2018-04-10

## 2018-04-10 ENCOUNTER — ANESTHESIA EVENT (OUTPATIENT)
Dept: GASTROENTEROLOGY | Facility: HOSPITAL | Age: 63
End: 2018-04-10

## 2018-04-10 VITALS
OXYGEN SATURATION: 96 % | SYSTOLIC BLOOD PRESSURE: 122 MMHG | RESPIRATION RATE: 16 BRPM | HEIGHT: 72 IN | WEIGHT: 217 LBS | BODY MASS INDEX: 29.39 KG/M2 | HEART RATE: 69 BPM | TEMPERATURE: 97.5 F | DIASTOLIC BLOOD PRESSURE: 89 MMHG

## 2018-04-10 PROCEDURE — 25010000002 FENTANYL CITRATE (PF) 100 MCG/2ML SOLUTION: Performed by: NURSE ANESTHETIST, CERTIFIED REGISTERED

## 2018-04-10 PROCEDURE — 25010000002 MIDAZOLAM PER 1 MG: Performed by: NURSE ANESTHETIST, CERTIFIED REGISTERED

## 2018-04-10 PROCEDURE — 25010000002 PROPOFOL 10 MG/ML EMULSION: Performed by: NURSE ANESTHETIST, CERTIFIED REGISTERED

## 2018-04-10 RX ORDER — PROPOFOL 10 MG/ML
VIAL (ML) INTRAVENOUS AS NEEDED
Status: DISCONTINUED | OUTPATIENT
Start: 2018-04-10 | End: 2018-04-10 | Stop reason: SURG

## 2018-04-10 RX ORDER — FENTANYL CITRATE 50 UG/ML
INJECTION, SOLUTION INTRAMUSCULAR; INTRAVENOUS AS NEEDED
Status: DISCONTINUED | OUTPATIENT
Start: 2018-04-10 | End: 2018-04-10 | Stop reason: SURG

## 2018-04-10 RX ORDER — MIDAZOLAM HYDROCHLORIDE 1 MG/ML
INJECTION INTRAMUSCULAR; INTRAVENOUS AS NEEDED
Status: DISCONTINUED | OUTPATIENT
Start: 2018-04-10 | End: 2018-04-10 | Stop reason: SURG

## 2018-04-10 RX ORDER — DEXTROSE AND SODIUM CHLORIDE 5; .45 G/100ML; G/100ML
30 INJECTION, SOLUTION INTRAVENOUS CONTINUOUS
Status: DISCONTINUED | OUTPATIENT
Start: 2018-04-10 | End: 2018-04-10 | Stop reason: HOSPADM

## 2018-04-10 RX ADMIN — MIDAZOLAM 1 MG: 1 INJECTION INTRAMUSCULAR; INTRAVENOUS at 10:06

## 2018-04-10 RX ADMIN — DEXTROSE AND SODIUM CHLORIDE 30 ML/HR: 5; 450 INJECTION, SOLUTION INTRAVENOUS at 09:01

## 2018-04-10 RX ADMIN — PROPOFOL 20 MG: 10 INJECTION, EMULSION INTRAVENOUS at 10:15

## 2018-04-10 RX ADMIN — PROPOFOL 20 MG: 10 INJECTION, EMULSION INTRAVENOUS at 10:13

## 2018-04-10 RX ADMIN — PROPOFOL 40 MG: 10 INJECTION, EMULSION INTRAVENOUS at 10:17

## 2018-04-10 RX ADMIN — PROPOFOL 20 MG: 10 INJECTION, EMULSION INTRAVENOUS at 10:11

## 2018-04-10 RX ADMIN — FENTANYL CITRATE 25 MCG: 50 INJECTION, SOLUTION INTRAMUSCULAR; INTRAVENOUS at 10:06

## 2018-04-10 RX ADMIN — PROPOFOL 50 MG: 10 INJECTION, EMULSION INTRAVENOUS at 10:09

## 2018-04-10 NOTE — H&P
Silvino Cheema DO,[facility]  Gastroenterology  Hepatology  Endoscopy  Board Certified in Internal Medicine and gastroenterology  [address], [address]  [place]. [address]  - [phone]   F - [phone]     GASTROENTEROLOGY HISTORY AND PHYSICAL  NOTE   SILVINO CHEEMA DO.         SUBJECTIVE:   4/10/2018    Name: Hema Gaspar  DOD: 1955      Chief Complaint:       Subjective : History of diverticulitis.  Personal history of colon polyps.  Family history of colon cancer    Patient is 62 y.o. male presents with desire for elective colonoscopy.      ROS/HISTORY/ CURRENT MEDICATIONS/OBJECTIVE/VS/PE:   Review of Systems:   Review of Systems    History:     Past Medical History:   Diagnosis Date   • Abnormal EKG    • Bradycardia    • Diabetes mellitus    • Herpes zoster dermatitis of eyelids     OD, no globe involvement      • Hypertension    • Hypotension    • Precordial pain    • Syncope and collapse      Past Surgical History:   Procedure Laterality Date   • TONSILLECTOMY     • VASECTOMY       Family History   Problem Relation Age of Onset   • No Known Problems Mother    • Heart disease Father      Social History   Substance Use Topics   • Smoking status: Never Smoker   • Smokeless tobacco: Never Used   • Alcohol use No     Prescriptions Prior to Admission   Medication Sig Dispense Refill Last Dose   • benazepril (LOTENSIN) 20 MG tablet Take 20 mg by mouth Daily.   4/9/2018 at Unknown time   • HM LANCETS MICRO THIN 33G misc    4/9/2018 at Unknown time   • magnesium oxide (MAGOX) 400 (241.3 MG) MG tablet tablet Take 400 mg by mouth Daily.   4/9/2018 at Unknown time   • meloxicam (MOBIC) 15 MG tablet Take 15 mg by mouth.   4/9/2018 at Unknown time   • metFORMIN (GLUCOPHAGE) 1000 MG tablet Take 1,000 mg by mouth 2 (Two) Times a Day With Meals.   4/9/2018 at Unknown time   • metoprolol succinate XL (TOPROL-XL) 25 MG 24 hr tablet Take 1 tablet by mouth Daily. 90 tablet 1 4/9/2018 at Unknown  time   • midodrine (PROAMATINE) 5 MG tablet Take 1 tablet by mouth 3 (Three) Times a Day. 90 tablet 6 4/9/2018 at Unknown time   • aspirin 81 MG EC tablet Take 81 mg by mouth Daily.   4/5/2018   • HYDROcodone-acetaminophen (NORCO) 7.5-325 MG per tablet Take 1 tablet by mouth Every 6 (Six) Hours As Needed for Moderate Pain . 15 tablet 0 Unknown at Unknown time   • ondansetron ODT (ZOFRAN-ODT) 4 MG disintegrating tablet Take 1 tablet by mouth Every 6 (Six) Hours As Needed for Nausea or Vomiting. 10 tablet 0 Unknown at Unknown time     Allergies:  Codeine    I have reviewed the patients medical history, surgical history and family history in the available medical record system.     Current Medications:     Current Facility-Administered Medications   Medication Dose Route Frequency Provider Last Rate Last Dose   • dextrose 5 % and sodium chloride 0.45 % infusion  30 mL/hr Intravenous Continuous Trev Day, DO 30 mL/hr at 04/10/18 0901 30 mL/hr at 04/10/18 0901       Objective     Physical Exam:   Temp:  [96.7 °F (35.9 °C)] 96.7 °F (35.9 °C)  Heart Rate:  [93] 93  Resp:  [20] 20  BP: (166)/(98) 166/98    Physical Exam:  General Appearance:    Alert, cooperative, in no acute distress   Head:    Normocephalic, without obvious abnormality, atraumatic   Eyes:            Lids and lashes normal, conjunctivae and sclerae normal, no   icterus, no pallor, corneas clear, PERRLA   Ears:    Ears appear intact with no abnormalities noted   Throat:   No oral lesions, no thrush, oral mucosa moist   Neck:   No adenopathy, supple, trachea midline, no thyromegaly, no     carotid bruit, no JVD   Back:     No kyphosis present, no scoliosis present, no skin lesions,       erythema or scars, no tenderness to percussion or                   palpation,   range of motion normal   Lungs:     Clear to auscultation,respirations regular, even and                   unlabored    Heart:    Regular rhythm and normal rate, normal S1 and S2, no             murmur, no gallop, no rub, no click   Breast Exam:    Deferred   Abdomen:     Normal bowel sounds, no masses, no organomegaly, soft        non-tender, non-distended, no guarding, no rebound                 tenderness   Genitalia:    Deferred   Extremities:   Moves all extremities well, no edema, no cyanosis, no              redness   Pulses:   Pulses palpable and equal bilaterally   Skin:   No bleeding, bruising or rash   Lymph nodes:   No palpable adenopathy   Neurologic:   Cranial nerves 2 - 12 grossly intact, sensation intact, DTR        present and equal bilaterally      Results Review:     Lab Results   Component Value Date    WBC 8.55 02/21/2018    WBC 9.2 01/02/2017    WBC 7.9 01/26/2016    HGB 13.2 (L) 02/21/2018    HGB 15.0 01/02/2017    HGB 13.7 01/26/2016    HCT 38.2 (L) 02/21/2018    HCT 43.1 01/02/2017    HCT 39.2 01/26/2016     02/21/2018     01/02/2017     01/26/2016             No results found for: LIPASE  Lab Results   Component Value Date    INR 0.9 01/02/2017          Radiology Review:  Imaging Results (last 72 hours)     ** No results found for the last 72 hours. **           I reviewed the patient's new clinical results.  I reviewed the patient's new imaging results and agree with the interpretation.     ASSESSMENT/PLAN:   ASSESSMENT:   1.  Personal history of colon polyps  2.  Family history of colon cancer  3.  Recent acute diverticulitis, now resolved    PLAN:   1.  Colonoscopy    Risk and benefits associated with the procedure are reviewed with the patient.  The patient wishes to proceed      Trev Day DO  04/10/18  9:53 AM

## 2018-04-10 NOTE — ANESTHESIA PREPROCEDURE EVALUATION
Anesthesia Evaluation     NPO Solid Status: > 4 hours  NPO Liquid Status: > 8 hours           Airway   Mallampati: III  TM distance: >3 FB  Neck ROM: full  Possible difficult intubation  Dental    (+) lower dentures and upper dentures    Pulmonary     breath sounds clear to auscultation  Cardiovascular   Exercise tolerance: good (4-7 METS)    Rhythm: regular  Rate: normal    (+) hypertension well controlled, hyperlipidemia,       Neuro/Psych  GI/Hepatic/Renal/Endo    (+) obesity,   diabetes mellitus type 2 well controlled,     Musculoskeletal     Abdominal    Substance History      OB/GYN          Other                      Anesthesia Plan    ASA 3     MAC     Anesthetic plan and risks discussed with patient.    Plan discussed with CRNA.

## 2018-04-10 NOTE — ANESTHESIA POSTPROCEDURE EVALUATION
Patient: Hema Gaspar    Procedure Summary     Date:  04/10/18 Room / Location:  [facility] ENDOSCOPY 2 / [facility] ENDOSCOPY    Anesthesia Start:  1006 Anesthesia Stop:  1020    Procedure:  COLONOSCOPY (N/A ) Diagnosis:       History of colonic polyps      (History of colonic polyps [Z86.010])    Surgeon:  Trve Day DO Provider:  Joan Damon CRNA    Anesthesia Type:  MAC ASA Status:  3          Anesthesia Type: MAC  Last vitals  BP   166/98 (04/10/18 0858)   Temp   96.7 °F (35.9 °C) (04/10/18 0858)   Pulse   93 (04/10/18 0858)   Resp   20 (04/10/18 0858)     SpO2   96 % (04/10/18 0858)     Post Anesthesia Care and Evaluation    Patient location during evaluation: bedside  Patient participation: complete - patient participated  Level of consciousness: awake and alert  Pain score: 0  Pain management: adequate  Airway patency: patent  Anesthetic complications: No anesthetic complications  PONV Status: none  Cardiovascular status: hemodynamically stable  Respiratory status: spontaneous ventilation  Hydration status: acceptable

## 2018-07-20 ENCOUNTER — OFFICE VISIT (OUTPATIENT)
Dept: CARDIOLOGY | Facility: CLINIC | Age: 63
End: 2018-07-20

## 2018-07-20 VITALS
HEIGHT: 72 IN | WEIGHT: 225.06 LBS | BODY MASS INDEX: 30.48 KG/M2 | SYSTOLIC BLOOD PRESSURE: 166 MMHG | OXYGEN SATURATION: 98 % | HEART RATE: 83 BPM | DIASTOLIC BLOOD PRESSURE: 80 MMHG

## 2018-07-20 DIAGNOSIS — I95.0 IDIOPATHIC HYPOTENSION: ICD-10-CM

## 2018-07-20 DIAGNOSIS — E78.2 MIXED HYPERLIPIDEMIA: Primary | ICD-10-CM

## 2018-07-20 PROCEDURE — 99214 OFFICE O/P EST MOD 30 MIN: CPT | Performed by: INTERNAL MEDICINE

## 2018-07-20 NOTE — PROGRESS NOTES
Hema Gaspar  63 y.o. male    07/20/2018  1. Mixed hyperlipidemia    2. Idiopathic hypotension        History of Present Illness    Mr. 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  His done well from a symptom standpoint with no reoccurrence of dizziness or syncope.   He continues to maintain a very high fluid intake up to about 80 ounces of fluid per day.  He does have excessive sweating.  Clinical exam was unremarkable.    SUBJECTIVE    Allergies   Allergen Reactions   • Codeine Mental Status Change         Past Medical History:   Diagnosis Date   • Abnormal EKG    • Bradycardia    • Diabetes mellitus (CMS/HCC)    • Herpes zoster dermatitis of eyelids     OD, no globe involvement      • Hypertension    • Hypotension    • Precordial pain    • Syncope and collapse          Past Surgical History:   Procedure Laterality Date   • COLONOSCOPY N/A 4/10/2018    Procedure: COLONOSCOPY;  Surgeon: Trev Day DO;  Location: [facility] ENDOSCOPY;  Service: Gastroenterology   • TONSILLECTOMY     • VASECTOMY           Family History   Problem Relation Age of Onset   • No Known Problems Mother    • Heart disease Father          Social History     Social History   • Marital status:      Spouse name: N/A   • Number of children: N/A   • Years of education: N/A     Occupational History   • Not on file.     Social History Main Topics   • Smoking status: Never Smoker   • Smokeless tobacco: Never Used   • Alcohol use No   • Drug use: No   • Sexual activity: Defer     Other Topics Concern   • Not on file     Social History Narrative   • No narrative on file         Current Outpatient Prescriptions   Medication Sig Dispense Refill   • aspirin 81 MG EC tablet Take 81 mg by mouth Daily.     • benazepril (LOTENSIN) 20 MG tablet Take 20 mg by mouth Daily.     • HM LANCETS MICRO THIN 33G misc      • magnesium oxide (MAGOX) 400 (241.3 MG) MG tablet tablet Take 400 mg by mouth Daily.     •  "metFORMIN (GLUCOPHAGE) 1000 MG tablet Take 1,000 mg by mouth 2 (Two) Times a Day With Meals.     • metoprolol succinate XL (TOPROL-XL) 25 MG 24 hr tablet Take 1 tablet by mouth Daily. 90 tablet 1   • midodrine (PROAMATINE) 5 MG tablet Take 1 tablet by mouth 3 (Three) Times a Day. 90 tablet 6     No current facility-administered medications for this visit.          OBJECTIVE    /80   Pulse 83   Ht 182.9 cm (72\")   Wt 102 kg (225 lb 1 oz)   SpO2 98%   BMI 30.52 kg/m²         Review of Systems     Constitutional:  Denies recent weight loss, weight gain, fever or chills, no change in exercise tolerance     HENT:  Denies any hearing loss, epistaxis, hoarseness, or difficulty speaking.     Eyes: Wears eyeglasses or contact lenses     Respiratory:  Denies dyspnea with exertion,no cough, wheezing, or hemoptysis.     Cardiovascular: Negative for palpations, chest pain, orthopnea, PND, peripheral edema, syncope, or claudication.     Gastrointestinal:  Denies change in bowel habits, dyspepsia, ulcer disease, hematochezia, or melena.     Endocrine: Negative for cold intolerance, heat intolerance, polydipsia, polyphagia and polyuria    Genitourinary: Negative.      Musculoskeletal: Denies any history of arthritic symptoms or back problems     Skin:  Denies any change in hair or nails, rashes, or skin lesions.     Allergic/Immunologic: Negative.  Negative for environmental allergies, food allergies and immunocompromised state.     Neurological:  Denies any history of recurrent headaches, strokes, TIA, or seizure disorder.     Hematological: Denies any food allergies, seasonal allergies, bleeding disorders, or lymphadenopathy.     Psychiatric/Behavioral: Denies any history of depression, substance abuse, or change in cognitive function.         Physical Exam     Constitutional: Cooperative, alert and oriented, well-developed, well-nourished, in no acute distress.     HENT:   Head: Normocephalic, normal hair patterns, no " masses or tenderness.  Ears, Nose, and Throat: No gross abnormalities. No pallor or cyanosis.   Eyes: EOMS intact, PERRL, conjunctivae and lids unremarkable. Fundoscopic exam and visual fields not performed.   Neck: No palpable masses or adenopathy, no thyromegaly, no JVD, carotid pulses are full and equal bilaterally and without  Bruits.     Cardiovascular: Regular rhythm, S1 and S2 normal, no S3 or S4. Apical impulse not displaced. No murmurs, gallops, or rubs detected.     Pulmonary/Chest: Chest: normal symmetry, no tenderness to palpation, normal respiratory excursion, no intercostal retraction, no use of accessory muscles.            Pulmonary: Normal breath sounds. No rales or ronchi.    Abdominal: Abdomen soft, bowel sounds normoactive, no masses, no hepatosplenomegaly, non-tender, no bruits.     Musculoskeletal: No deformities, clubbing, cyanosis, erythema, or edema observed. There are no spinal abnormalities noted. Normal muscle strength and tone. Pulses full and equal in all extremities, no bruits auscultated.     Neurological: No gross motor or sensory deficits noted, affect appropriate, oriented to time, person, place.     Skin: Warm and dry to the touch, no apparent skin lesions or masses noted.     Psychiatric: He has a normal mood and affect. His behavior is normal. Judgment and thought content normal.         Procedures      Lab Results   Component Value Date    WBC 8.55 02/21/2018    HGB 13.2 (L) 02/21/2018    HCT 38.2 (L) 02/21/2018    MCV 84.5 02/21/2018     02/21/2018     Lab Results   Component Value Date    GLUCOSE 153 (H) 02/21/2018    BUN 18 02/21/2018    CREATININE 0.90 02/21/2018    EGFRIFNONA 86 02/21/2018    BCR 20.0 02/21/2018    CO2 25.0 02/21/2018    CALCIUM 9.2 02/21/2018    ALBUMIN 4.50 02/21/2018    AST 30 02/21/2018    ALT 39 02/21/2018     No results found for: CHOL  No results found for: TRIG  No results found for: HDL  No components found for: LDLCALC  No results found  for: LDL  No results found for: HDLLDLRATIO  No components found for: CHOLHDL  No results found for: HGBA1C  Lab Results   Component Value Date    TSH 2.17 01/02/2017           ASSESSMENT AND PLAN  Mr. Gaspar is stable with no reoccurrence of neurocardiogenic syncope.  Antiplatelet therapy with aspirin, antihypertensive therapy with benazepril and metoprolol succinate and Midodrine has been continued.    Hema was seen today for follow-up and hypertension.    Diagnoses and all orders for this visit:    Mixed hyperlipidemia    Idiopathic hypotension        Angie Vogel MD  7/20/2018  8:13 AM

## 2018-07-26 RX ORDER — MIDODRINE HYDROCHLORIDE 5 MG/1
5 TABLET ORAL 3 TIMES DAILY
Qty: 90 TABLET | Refills: 6 | Status: SHIPPED | OUTPATIENT
Start: 2018-07-26 | End: 2019-12-30 | Stop reason: SDUPTHER

## 2019-01-25 ENCOUNTER — OFFICE VISIT (OUTPATIENT)
Dept: CARDIOLOGY | Facility: CLINIC | Age: 64
End: 2019-01-25

## 2019-01-25 VITALS
WEIGHT: 212 LBS | HEIGHT: 72 IN | DIASTOLIC BLOOD PRESSURE: 80 MMHG | HEART RATE: 89 BPM | BODY MASS INDEX: 28.71 KG/M2 | OXYGEN SATURATION: 100 % | SYSTOLIC BLOOD PRESSURE: 120 MMHG

## 2019-01-25 DIAGNOSIS — I10 ESSENTIAL HYPERTENSION: Primary | ICD-10-CM

## 2019-01-25 DIAGNOSIS — R55 SYNCOPE AND COLLAPSE: ICD-10-CM

## 2019-01-25 DIAGNOSIS — E78.2 MIXED HYPERLIPIDEMIA: ICD-10-CM

## 2019-01-25 PROCEDURE — 99214 OFFICE O/P EST MOD 30 MIN: CPT | Performed by: INTERNAL MEDICINE

## 2019-01-25 RX ORDER — MELOXICAM 15 MG/1
15 TABLET ORAL DAILY
COMMUNITY
End: 2023-03-08

## 2019-01-25 NOTE — PROGRESS NOTES
Hema Gaspar  63 y.o. male    01/25/2019  1. Essential hypertension    2. Mixed hyperlipidemia    3. Syncope and collapse        History of Present Illness    Mr. Gaspar is here for follow-up of his above stated problems.  He denies any chest pain, palpitation, dizziness or syncope at this time.  He is not a very good historian but it appears that a few weeks prior most likely in December 2018 he had sharp chest pain and was suspected to have pericarditis for which he was treated.  He has had history of pericarditis in the remote past.  His symptoms apparently improved with anti-inflammatory agents.  He denied any chest pain at this time.  Blood pressure and heart rate was in the normal range and no signs of congestive heart failure was noted.  He has been compliant with his medications.  His LDL was 108 when checked in October 2018.        SUBJECTIVE    Allergies   Allergen Reactions   • Codeine Mental Status Change         Past Medical History:   Diagnosis Date   • Abnormal EKG    • Bradycardia    • Diabetes mellitus (CMS/HCC)    • Herpes zoster dermatitis of eyelids     OD, no globe involvement      • Hypertension    • Hypotension    • Precordial pain    • Syncope and collapse          Past Surgical History:   Procedure Laterality Date   • COLONOSCOPY N/A 4/10/2018    Procedure: COLONOSCOPY;  Surgeon: Trev Day DO;  Location: [facility] ENDOSCOPY;  Service: Gastroenterology   • TONSILLECTOMY     • VASECTOMY           Family History   Problem Relation Age of Onset   • No Known Problems Mother    • Heart disease Father          Social History     Socioeconomic History   • Marital status:      Spouse name: Not on file   • Number of children: Not on file   • Years of education: Not on file   • Highest education level: Not on file   Social Needs   • Financial resource strain: Not on file   • Food insecurity - worry: Not on file   • Food insecurity - inability: Not on file   • Transportation  "needs - medical: Not on file   • Transportation needs - non-medical: Not on file   Occupational History   • Not on file   Tobacco Use   • Smoking status: Never Smoker   • Smokeless tobacco: Never Used   Substance and Sexual Activity   • Alcohol use: No   • Drug use: No   • Sexual activity: Defer   Other Topics Concern   • Not on file   Social History Narrative   • Not on file         Current Outpatient Medications   Medication Sig Dispense Refill   • aspirin 81 MG EC tablet Take 81 mg by mouth Daily.     • benazepril (LOTENSIN) 20 MG tablet Take 20 mg by mouth Daily.     • HM LANCETS MICRO THIN 33G misc      • magnesium oxide (MAGOX) 400 (241.3 MG) MG tablet tablet Take 400 mg by mouth Daily.     • meloxicam (MOBIC) 15 MG tablet Take 15 mg by mouth Daily.     • metFORMIN (GLUCOPHAGE) 1000 MG tablet Take 1,000 mg by mouth 2 (Two) Times a Day With Meals.     • metoprolol succinate XL (TOPROL-XL) 25 MG 24 hr tablet Take 1 tablet by mouth Daily. 90 tablet 1   • midodrine (PROAMATINE) 5 MG tablet Take 1 tablet by mouth 3 (Three) Times a Day. 90 tablet 6     No current facility-administered medications for this visit.          OBJECTIVE    /80   Pulse 89   Ht 182.9 cm (72.01\")   Wt 96.2 kg (212 lb)   SpO2 100%   BMI 28.75 kg/m²         Review of Systems     Constitutional:  Denies recent weight loss, weight gain, fever or chills, no change in exercise tolerance     HENT:  Denies any hearing loss, epistaxis, hoarseness, or difficulty speaking.     Eyes: Wears eyeglasses or contact lenses     Respiratory:  Denies dyspnea with exertion,no cough, wheezing, or hemoptysis.     Cardiovascular: Negative for palpations, chest pain, orthopnea, PND, peripheral edema, syncope, or claudication.     Gastrointestinal:  Denies change in bowel habits, dyspepsia, ulcer disease, hematochezia, or melena.     Endocrine: Negative for cold intolerance, heat intolerance, polydipsia, polyphagia and polyuria. 's 2 diabetes " mellitus    Genitourinary: Negative.      Musculoskeletal: Denies any history of arthritic symptoms or back problems     Skin:  Denies any change in hair or nails, rashes, or skin lesions.     Allergic/Immunologic: Negative.  Negative for environmental allergies, food allergies and immunocompromised state.     Neurological:  Denies any history of recurrent headaches, strokes, TIA, or seizure disorder.     Hematological: Denies any food allergies, seasonal allergies, bleeding disorders, or lymphadenopathy.     Psychiatric/Behavioral: Denies any history of depression, substance abuse, or change in cognitive function.         Physical Exam     Constitutional: Cooperative, alert and oriented,  in no acute distress.     HENT:   Head: Normocephalic, normal hair patterns, no masses or tenderness.  Ears, Nose, and Throat: No gross abnormalities. No pallor or cyanosis.   Eyes: EOMS intact, PERRL, conjunctivae and lids unremarkable. Fundoscopic exam and visual fields not performed.   Neck: No palpable masses or adenopathy, no thyromegaly, no JVD, carotid pulses are full and equal bilaterally and without  Bruits.     Cardiovascular: Regular rhythm, S1 and S2 normal, no S3 or S4. No murmurs, gallops, or rubs detected.     Pulmonary/Chest: Chest: normal symmetry, no tenderness to palpation, normal respiratory excursion, no intercostal retraction, no use of accessory muscles.            Pulmonary: Normal breath sounds. No rales or ronchi.    Abdominal: Abdomen soft, bowel sounds normoactive, no masses, no hepatosplenomegaly, non-tender, no bruits.     Musculoskeletal: No deformities, clubbing, cyanosis, erythema, or edema observed. There are no spinal abnormalities noted. Normal muscle strength and tone. Pulses full and equal in all extremities, no bruits auscultated.     Neurological: No gross motor or sensory deficits noted, affect appropriate, oriented to time, person, place.     Skin: Warm and dry to the touch, no apparent  skin lesions or masses noted.     Psychiatric: He has a normal mood and affect. His behavior is normal. Judgment and thought content normal.         Procedures      Lab Results   Component Value Date    WBC 8.55 02/21/2018    HGB 13.2 (L) 02/21/2018    HCT 38.2 (L) 02/21/2018    MCV 84.5 02/21/2018     02/21/2018     Lab Results   Component Value Date    GLUCOSE 153 (H) 02/21/2018    BUN 18 02/21/2018    CREATININE 0.90 02/21/2018    EGFRIFNONA 86 02/21/2018    BCR 20.0 02/21/2018    CO2 25.0 02/21/2018    CALCIUM 9.2 02/21/2018    ALBUMIN 4.50 02/21/2018    AST 30 02/21/2018    ALT 39 02/21/2018     No results found for: CHOL  No results found for: TRIG  No results found for: HDL  No components found for: LDLCALC  No results found for: LDL  No results found for: HDLLDLRATIO  No components found for: CHOLHDL  No results found for: HGBA1C  Lab Results   Component Value Date    TSH 2.17 01/02/2017           ASSESSMENT AND PLAN  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  An echocardiogram to assess left ventricular and valvular function and pericardium is being arranged.  I've continued antiplatelet therapy with aspirin, antihypertensive therapy with benazepril, metoprolol succinate and midodrine has been continued.  He will continue to watch his diet closely.  He maintains a very high fluid intake of 60-70 ounces per day.    Hema was seen today for follow-up.    Diagnoses and all orders for this visit:    Essential hypertension    Mixed hyperlipidemia    Syncope and collapse        Patient's Body mass index is 28.75 kg/m². BMI is above normal parameters. Recommendations include: exercise counseling and nutrition counseling.        Angie Vogel MD  1/25/2019  8:52 AM

## 2019-08-16 ENCOUNTER — OFFICE VISIT (OUTPATIENT)
Dept: CARDIOLOGY | Facility: CLINIC | Age: 64
End: 2019-08-16

## 2019-08-16 VITALS
HEIGHT: 72 IN | DIASTOLIC BLOOD PRESSURE: 80 MMHG | SYSTOLIC BLOOD PRESSURE: 128 MMHG | OXYGEN SATURATION: 98 % | WEIGHT: 223 LBS | BODY MASS INDEX: 30.2 KG/M2 | HEART RATE: 70 BPM

## 2019-08-16 DIAGNOSIS — I10 ESSENTIAL HYPERTENSION: Primary | ICD-10-CM

## 2019-08-16 DIAGNOSIS — R55 SYNCOPE AND COLLAPSE: ICD-10-CM

## 2019-08-16 PROCEDURE — 99214 OFFICE O/P EST MOD 30 MIN: CPT | Performed by: INTERNAL MEDICINE

## 2019-08-16 NOTE — PROGRESS NOTES
Hema Gaspar  64 y.o. male    08/16/2019  1. Essential hypertension    2. Syncope and collapse        History of Present Illness  Mr. Gaspar is here for follow-up of his above-stated problems.  He is done very well and denied any chest pain, shortness of breath, palpitation, dizziness or syncope.  He has been maintaining a high fluid intake and has not had any issues.  Blood pressure was in the normal range.      SUBJECTIVE    Allergies   Allergen Reactions   • Codeine Mental Status Change         Past Medical History:   Diagnosis Date   • Abnormal EKG    • Bradycardia    • Diabetes mellitus (CMS/HCC)    • Herpes zoster dermatitis of eyelids     OD, no globe involvement      • Hypertension    • Hypotension    • Precordial pain    • Syncope and collapse          Past Surgical History:   Procedure Laterality Date   • COLONOSCOPY N/A 4/10/2018    Procedure: COLONOSCOPY;  Surgeon: Trev Day DO;  Location: [facility] ENDOSCOPY;  Service: Gastroenterology   • TONSILLECTOMY     • VASECTOMY           Family History   Problem Relation Age of Onset   • No Known Problems Mother    • Heart disease Father          Social History     Socioeconomic History   • Marital status:      Spouse name: Not on file   • Number of children: Not on file   • Years of education: Not on file   • Highest education level: Not on file   Tobacco Use   • Smoking status: Never Smoker   • Smokeless tobacco: Never Used   Substance and Sexual Activity   • Alcohol use: No   • Drug use: No   • Sexual activity: Defer         Current Outpatient Medications   Medication Sig Dispense Refill   • aspirin 81 MG EC tablet Take 81 mg by mouth Daily.     • benazepril (LOTENSIN) 20 MG tablet Take 20 mg by mouth Daily.     • HM LANCETS MICRO THIN 33G misc      • magnesium oxide (MAGOX) 400 (241.3 MG) MG tablet tablet Take 400 mg by mouth Daily.     • meloxicam (MOBIC) 15 MG tablet Take 15 mg by mouth Daily.     • metFORMIN (GLUCOPHAGE) 1000 MG  "tablet Take 1,000 mg by mouth 2 (Two) Times a Day With Meals.     • metoprolol succinate XL (TOPROL-XL) 25 MG 24 hr tablet Take 1 tablet by mouth Daily. 90 tablet 1   • midodrine (PROAMATINE) 5 MG tablet Take 1 tablet by mouth 3 (Three) Times a Day. 90 tablet 6     No current facility-administered medications for this visit.          OBJECTIVE    /80   Pulse 70   Ht 182.9 cm (72\")   Wt 101 kg (223 lb)   SpO2 98%   BMI 30.24 kg/m²         Review of Systems     Constitutional:  Denies recent weight loss, weight gain, fever or chills, no change in exercise tolerance     HENT:  Denies any hearing loss, epistaxis, hoarseness, or difficulty speaking.     Eyes: Wears eyeglasses or contact lenses     Respiratory:  Denies dyspnea with exertion,no cough, wheezing, or hemoptysis.     Cardiovascular: Negative for palpations, chest pain, orthopnea, PND, peripheral edema, syncope, or claudication.     Gastrointestinal:  Denies change in bowel habits, dyspepsia, ulcer disease, hematochezia, or melena.     Endocrine: Negative for cold intolerance, heat intolerance, polydipsia, polyphagia and polyuria.    Genitourinary: Negative.      Musculoskeletal: Denies any history of arthritic symptoms or back problems     Skin:  Denies any change in hair or nails, rashes, or skin lesions.     Allergic/Immunologic: Negative.  Negative for environmental allergies, food allergies and immunocompromised state.     Neurological:  Denies any history of recurrent headaches, strokes, TIA, or seizure disorder.     Hematological: Denies any food allergies, seasonal allergies, bleeding disorders, or lymphadenopathy.     Psychiatric/Behavioral: Denies any history of depression, substance abuse, or change in cognitive function.         Physical Exam     Constitutional: Cooperative, alert and oriented,  in no acute distress.     HENT:   Head: Normocephalic, normal hair patterns, no masses or tenderness.  Ears, Nose, and Throat: No gross " abnormalities. No pallor or cyanosis.  Eyes: EOMS intact, PERRL, conjunctivae and lids unremarkable. Fundoscopic exam and visual fields not performed.   Neck: No palpable masses or adenopathy, no thyromegaly, no JVD, carotid pulses are full and equal bilaterally and without  Bruits.     Cardiovascular: Regular rhythm, S1 and S2 normal, no S3 or S4.  No murmurs, gallops, or rubs detected.     Pulmonary/Chest: Chest: normal symmetry, no tenderness to palpation, normal respiratory excursion, no intercostal retraction, no use of accessory muscles.            Pulmonary: Normal breath sounds. No rales or ronchi.    Abdominal: Abdomen soft, bowel sounds normoactive, no masses, no hepatosplenomegaly, non-tender, no bruits.     Musculoskeletal: No deformities, clubbing, cyanosis, erythema, or edema observed.     Neurological: No gross motor or sensory deficits noted, affect appropriate, oriented to time, person, place.     Skin: Warm and dry to the touch, no apparent skin lesions or masses noted.     Psychiatric: He has a normal mood and affect. His behavior is normal. Judgment and thought content normal.         Procedures      Lab Results   Component Value Date    WBC 8.55 02/21/2018    HGB 13.2 (L) 02/21/2018    HCT 38.2 (L) 02/21/2018    MCV 84.5 02/21/2018     02/21/2018     Lab Results   Component Value Date    GLUCOSE 153 (H) 02/21/2018    BUN 20 (H) 12/17/2018    CREATININE 0.68 12/17/2018    EGFRIFNONA 86 02/21/2018    EGFRIFAFRI 143 12/17/2018    BCR 20.0 02/21/2018    CO2 28 12/17/2018    CALCIUM 9.3 12/17/2018    ALBUMIN 4.7 12/17/2018    AST 20 12/17/2018    ALT 28 12/17/2018     Lab Results   Component Value Date    CHOL 188 10/08/2018     Lab Results   Component Value Date    TRIG 89 10/08/2018     Lab Results   Component Value Date    HDL 63 10/08/2018     No components found for: LDLCALC  Lab Results   Component Value Date     (H) 10/08/2018     No results found for: HDLLDLRATIO  No components  found for: CHOLHDL  Lab Results   Component Value Date    HGBA1C 7.5 (H) 10/08/2018     Lab Results   Component Value Date    TSH 2.17 01/02/2017           ASSESSMENT AND PLAN  Mr. Gaspar is stable with no evidence of recurrence of syncope.  He has been compliant with his medication.  No cardiac symptoms are reported at the present time.  I have continued antiplatelet therapy with aspirin, low-dose metoprolol succinate and midodrine has been continued.  Antihypertensive therapy with benazepril has been continued.    Hema was seen today for follow-up.    Diagnoses and all orders for this visit:    Essential hypertension  -     Adult Transthoracic Echo Complete W/ Cont if Necessary Per Protocol; Future    Syncope and collapse  -     Adult Transthoracic Echo Complete W/ Cont if Necessary Per Protocol; Future        Patient's Body mass index is 30.24 kg/m². BMI is above normal parameters. Recommendations include: exercise counseling and nutrition counseling.  Patient is a non-smoker        Angie Vogel MD  8/16/2019  4:07 PM

## 2019-12-30 RX ORDER — MIDODRINE HYDROCHLORIDE 5 MG/1
5 TABLET ORAL 3 TIMES DAILY
Qty: 90 TABLET | Refills: 6 | Status: SHIPPED | OUTPATIENT
Start: 2019-12-30 | End: 2021-11-15 | Stop reason: SDUPTHER

## 2020-05-18 ENCOUNTER — OFFICE VISIT (OUTPATIENT)
Dept: CARDIOLOGY | Facility: CLINIC | Age: 65
End: 2020-05-18

## 2020-05-18 VITALS
BODY MASS INDEX: 28.44 KG/M2 | DIASTOLIC BLOOD PRESSURE: 72 MMHG | WEIGHT: 210 LBS | HEIGHT: 72 IN | SYSTOLIC BLOOD PRESSURE: 118 MMHG | HEART RATE: 64 BPM

## 2020-05-18 DIAGNOSIS — I10 ESSENTIAL HYPERTENSION: ICD-10-CM

## 2020-05-18 DIAGNOSIS — E78.2 MIXED HYPERLIPIDEMIA: ICD-10-CM

## 2020-05-18 DIAGNOSIS — R55 SYNCOPE AND COLLAPSE: ICD-10-CM

## 2020-05-18 DIAGNOSIS — I48.20 ATRIAL FIBRILLATION, CHRONIC (HCC): Primary | ICD-10-CM

## 2020-05-18 PROCEDURE — 99442 PR PHYS/QHP TELEPHONE EVALUATION 11-20 MIN: CPT | Performed by: INTERNAL MEDICINE

## 2020-05-18 NOTE — PROGRESS NOTES
Hema Gaspar  64 y.o. male      1. Atrial fibrillation, chronic    2. Essential hypertension    3. Mixed hyperlipidemia    4. Syncope and collapse        History of Present Illness:  This visit has been rescheduled as a phone visit to comply with patient safety concerns in accordance with CDC recommendations. Total time of discussion was 15 minutes.    You have chosen to receive care through a telephone visit. Do you consent to use a telephone visit for your medical care today? Yes    Mr. Gaspar is here for meant of history of neurocardiogenic syncope, hypertension and hyperlipidemia.  The diagnosis of atrial fibrillation mention above is an error.  The patient has no history of atrial fibrillation.  He is done very well and denied any chest pain, shortness of breath, palpitation, dizziness or syncope.  He has been maintaining a high fluid intake and has not had any issues.  Blood pressure was in the normal range.      SUBJECTIVE    Allergies   Allergen Reactions   • Codeine Mental Status Change         Past Medical History:   Diagnosis Date   • Abnormal EKG    • Bradycardia    • Diabetes mellitus (CMS/HCC)    • Herpes zoster dermatitis of eyelids     OD, no globe involvement      • Hypertension    • Hypotension    • Precordial pain    • Syncope and collapse          Past Surgical History:   Procedure Laterality Date   • COLONOSCOPY N/A 4/10/2018    Procedure: COLONOSCOPY;  Surgeon: Trev Day DO;  Location: [facility] ENDOSCOPY;  Service: Gastroenterology   • TONSILLECTOMY     • VASECTOMY           Family History   Problem Relation Age of Onset   • No Known Problems Mother    • Heart disease Father          Social History     Socioeconomic History   • Marital status:      Spouse name: Not on file   • Number of children: Not on file   • Years of education: Not on file   • Highest education level: Not on file   Tobacco Use   • Smoking status: Never Smoker   • Smokeless tobacco: Never Used  "  Substance and Sexual Activity   • Alcohol use: No   • Drug use: No   • Sexual activity: Defer         Current Outpatient Medications   Medication Sig Dispense Refill   • aspirin 81 MG EC tablet Take 81 mg by mouth Daily.     • benazepril (LOTENSIN) 20 MG tablet Take 20 mg by mouth Daily.     • HM LANCETS MICRO THIN 33G misc      • magnesium oxide (MAGOX) 400 (241.3 MG) MG tablet tablet Take 400 mg by mouth Daily.     • meloxicam (MOBIC) 15 MG tablet Take 15 mg by mouth Daily.     • metFORMIN (GLUCOPHAGE) 1000 MG tablet Take 1,000 mg by mouth 2 (Two) Times a Day With Meals.     • metoprolol succinate XL (TOPROL-XL) 25 MG 24 hr tablet Take 1 tablet by mouth Daily. 90 tablet 1   • midodrine (PROAMATINE) 5 MG tablet Take 1 tablet by mouth 3 (Three) Times a Day. 90 tablet 6     No current facility-administered medications for this visit.          OBJECTIVE    /72   Pulse 64   Ht 182.9 cm (72\")   Wt 95.3 kg (210 lb)   BMI 28.48 kg/m²         Review of Systems     Constitutional:  Denies recent weight loss, weight gain, fever or chills, no change in exercise tolerance     HENT:  Denies any hearing loss, epistaxis, hoarseness, or difficulty speaking.     Eyes: Wears eyeglasses or contact lenses     Respiratory:  Denies dyspnea with exertion,no cough, wheezing, or hemoptysis.     Cardiovascular: Negative for palpations, chest pain, orthopnea, PND, peripheral edema, syncope, or claudication.     Gastrointestinal:  Denies change in bowel habits, dyspepsia, ulcer disease, hematochezia, or melena.     Endocrine: Negative for cold intolerance, heat intolerance, polydipsia, polyphagia and polyuria.   in January 2020.  Hemoglobin A1c 7.6    Genitourinary: Negative.      Musculoskeletal: Denies any history of arthritic symptoms or back problems     Neurological:  Denies any history of recurrent headaches, strokes, TIA, or seizure disorder.       Lab Results   Component Value Date    WBC 8.55 02/21/2018    HGB 13.2 " (L) 02/21/2018    HCT 38.2 (L) 02/21/2018    MCV 84.5 02/21/2018     02/21/2018     Lab Results   Component Value Date    GLUCOSE 153 (H) 02/21/2018    BUN 18 02/21/2018    CREATININE 0.90 02/21/2018    EGFRIFNONA 86 02/21/2018    BCR 20.0 02/21/2018    CO2 25.0 02/21/2018    CALCIUM 9.2 02/21/2018    ALBUMIN 4.50 02/21/2018    AST 30 02/21/2018    ALT 39 02/21/2018     Lab Results   Component Value Date    CHOL 188 10/08/2018     Lab Results   Component Value Date    TRIG 89 10/08/2018     Lab Results   Component Value Date    HDL 63 10/08/2018     No components found for: LDLCALC  Lab Results   Component Value Date     (H) 10/08/2018     No results found for: HDLLDLRATIO  No components found for: CHOLHDL  Lab Results   Component Value Date    HGBA1C 7.6 (H) 01/22/2020     Lab Results   Component Value Date    TSH 2.17 01/02/2017           ASSESSMENT AND PLAN  Mr. Gaspar is stable with no evidence of recurrence of syncope.  He has been compliant with his medication.  No cardiac symptoms are reported at the present time.  I have continued antiplatelet therapy with aspirin, low-dose metoprolol succinate and midodrine has been continued.  Antihypertensive therapy with benazepril has been continued.    Hema was seen today for follow-up.    Diagnoses and all orders for this visit:    Atrial fibrillation, chronic    Essential hypertension    Mixed hyperlipidemia    Syncope and collapse        Patient's Body mass index is 28.48 kg/m². BMI is above normal parameters. Recommendations include: exercise counseling and nutrition counseling.  Patient is a non-smoker        Angie Vogel MD  5/18/2020  10:22

## 2020-11-24 ENCOUNTER — OFFICE VISIT (OUTPATIENT)
Dept: CARDIOLOGY | Facility: CLINIC | Age: 65
End: 2020-11-24

## 2020-11-24 VITALS
HEART RATE: 74 BPM | OXYGEN SATURATION: 100 % | WEIGHT: 226.6 LBS | DIASTOLIC BLOOD PRESSURE: 70 MMHG | SYSTOLIC BLOOD PRESSURE: 138 MMHG | BODY MASS INDEX: 30.69 KG/M2 | HEIGHT: 72 IN

## 2020-11-24 DIAGNOSIS — E78.2 MIXED HYPERLIPIDEMIA: ICD-10-CM

## 2020-11-24 DIAGNOSIS — R55 SYNCOPE AND COLLAPSE: ICD-10-CM

## 2020-11-24 DIAGNOSIS — I48.20 ATRIAL FIBRILLATION, CHRONIC (HCC): Primary | ICD-10-CM

## 2020-11-24 DIAGNOSIS — I10 ESSENTIAL HYPERTENSION: ICD-10-CM

## 2020-11-24 LAB
QT INTERVAL: 400 MS
QTC INTERVAL: 440 MS

## 2020-11-24 PROCEDURE — 99213 OFFICE O/P EST LOW 20 MIN: CPT | Performed by: INTERNAL MEDICINE

## 2020-11-24 PROCEDURE — 93000 ELECTROCARDIOGRAM COMPLETE: CPT | Performed by: INTERNAL MEDICINE

## 2020-11-24 RX ORDER — IPRATROPIUM BROMIDE 21 UG/1
2 SPRAY, METERED NASAL AS NEEDED
COMMUNITY
Start: 2020-01-07 | End: 2022-05-18

## 2020-11-24 RX ORDER — ALBUTEROL SULFATE 90 UG/1
AEROSOL, METERED RESPIRATORY (INHALATION) AS NEEDED
COMMUNITY
Start: 2020-09-02 | End: 2023-03-08

## 2020-11-24 NOTE — PROGRESS NOTES
Hema Gaspar  65 y.o. male    1. Atrial fibrillation, chronic (CMS/HCC)    2. Essential hypertension    3. Mixed hyperlipidemia    4. Syncope and collapse        History of Present Illness  Mr. Gaspar is here for follow-up of his above-stated problems.  Atrial fibrillation mentioned in the above diagnosis list is an error.  He has done very well and denied any chest pain, shortness of breath, palpitation, dizziness or syncope.  He has been maintaining a high fluid intake and has not had any issues.  He has been physically quite active without any restrictions.    Echocardiogram in August 2019 showed:.  · Left ventricular wall thickness is consistent with borderline concentric hypertrophy.  · Estimated EF = 61%.  · Left ventricular systolic function is normal.  · Left ventricular diastolic dysfunction (grade I) consistent with impaired relaxation.  · Mild dilation of the aortic root is present.  · Ao root diam 3.9 cm    EKG showed sinus rhythm with heart rate of 73 bpm.  Occasional PVCs.  Right bundle branch block, borderline first-degree AV block.    Clinical exam today was unremarkable with no signs of bronchospasm or congestive heart failure.  He has not had any spells of dizziness.    SUBJECTIVE    Allergies   Allergen Reactions   • Codeine Mental Status Change         Past Medical History:   Diagnosis Date   • Abnormal EKG    • Bradycardia    • Diabetes mellitus (CMS/HCC)    • Herpes zoster dermatitis of eyelids     OD, no globe involvement      • Hypertension    • Hypotension    • Precordial pain    • Syncope and collapse          Past Surgical History:   Procedure Laterality Date   • COLONOSCOPY N/A 4/10/2018    Procedure: COLONOSCOPY;  Surgeon: Trev Day DO;  Location: [facility] ENDOSCOPY;  Service: Gastroenterology   • TONSILLECTOMY     • VASECTOMY           Family History   Problem Relation Age of Onset   • No Known Problems Mother    • Heart disease Father          Social History  "    Socioeconomic History   • Marital status:      Spouse name: Not on file   • Number of children: Not on file   • Years of education: Not on file   • Highest education level: Not on file   Tobacco Use   • Smoking status: Never Smoker   • Smokeless tobacco: Never Used   Substance and Sexual Activity   • Alcohol use: No   • Drug use: No   • Sexual activity: Defer         Current Outpatient Medications   Medication Sig Dispense Refill   • albuterol sulfate  (90 Base) MCG/ACT inhaler As Needed.     • aspirin 81 MG EC tablet Take 81 mg by mouth Daily.     • benazepril (LOTENSIN) 20 MG tablet Take 20 mg by mouth Daily.     • HM LANCETS MICRO THIN 33G misc      • ipratropium (ATROVENT) 0.03 % nasal spray 2 sprays into the nostril(s) as directed by provider As Needed.     • magnesium oxide (MAGOX) 400 (241.3 MG) MG tablet tablet Take 400 mg by mouth Daily.     • meloxicam (MOBIC) 15 MG tablet Take 15 mg by mouth Daily.     • metFORMIN (GLUCOPHAGE) 1000 MG tablet Take 1,000 mg by mouth 2 (Two) Times a Day With Meals.     • metoprolol succinate XL (TOPROL-XL) 25 MG 24 hr tablet Take 1 tablet by mouth Daily. 90 tablet 1   • midodrine (PROAMATINE) 5 MG tablet Take 1 tablet by mouth 3 (Three) Times a Day. 90 tablet 6   • Omega-3 Fatty Acids (FISH OIL PO) Take  by mouth Daily.       No current facility-administered medications for this visit.          OBJECTIVE    /70 (BP Location: Left arm, Patient Position: Sitting, Cuff Size: Adult)   Pulse 74   Ht 182.9 cm (72\")   Wt 103 kg (226 lb 9.6 oz)   SpO2 100%   BMI 30.73 kg/m²         Review of Systems     Constitutional:  Denies recent weight loss, weight gain, fever or chills, no change in exercise tolerance     HENT:  Denies any hearing loss, epistaxis, hoarseness, or difficulty speaking.     Eyes: Wears eyeglasses or contact lenses     Respiratory:  Denies dyspnea with exertion,no cough, wheezing, or hemoptysis.     Cardiovascular: Negative for " palpations, chest pain, orthopnea, PND, peripheral edema, syncope, or claudication.     Gastrointestinal:  Denies change in bowel habits, dyspepsia, ulcer disease, hematochezia, or melena.     Endocrine: Negative for cold intolerance, heat intolerance, polydipsia, polyphagia and polyuria.    Genitourinary: Negative.      Musculoskeletal: Denies any history of arthritic symptoms or back problems      Neurological:  Denies any history of recurrent headaches, strokes, TIA, or seizure disorder.     Hematological: Denies any food allergies, seasonal allergies, bleeding disorders, or lymphadenopathy.     Psychiatric/Behavioral: Denies any history of depression, substance abuse, or change in cognitive function.         Physical Exam     Constitutional: Cooperative, alert and oriented,  in no acute distress.     HENT:   Head: Normocephalic, normal hair patterns, no masses or tenderness.  Ears, Nose, and Throat: No gross abnormalities. No pallor or cyanosis.  Eyes: EOMS intact, PERRL, conjunctivae and lids unremarkable. Fundoscopic exam and visual fields not performed.   Neck: No palpable masses or adenopathy, no thyromegaly, no JVD, carotid pulses are full and equal bilaterally and without  Bruits.     Cardiovascular: Regular rhythm, S1 and S2 normal, no S3 or S4.  No murmurs, gallops, or rubs detected.     Pulmonary/Chest: Chest: normal symmetry, no tenderness to palpation, normal respiratory excursion, no intercostal retraction, no use of accessory muscles.            Pulmonary: Normal breath sounds. No rales or ronchi.    Abdominal: Abdomen soft, bowel sounds normoactive, no masses, no hepatosplenomegaly, non-tender, no bruits.     Musculoskeletal: No deformities, clubbing, cyanosis, erythema, or edema observed.     Neurological: No gross motor or sensory deficits noted, affect appropriate, oriented to time, person, place.     Skin: Warm and dry to the touch, no apparent skin lesions or masses noted.     Psychiatric: He  has a normal mood and affect. His behavior is normal. Judgment and thought content normal.         Procedures      Lab Results   Component Value Date    WBC 8.55 02/21/2018    HGB 13.2 (L) 02/21/2018    HCT 38.2 (L) 02/21/2018    MCV 84.5 02/21/2018     02/21/2018     Lab Results   Component Value Date    GLUCOSE 153 (H) 02/21/2018    BUN 18 02/21/2018    CREATININE 0.90 02/21/2018    EGFRIFNONA 86 02/21/2018    BCR 20.0 02/21/2018    CO2 25.0 02/21/2018    CALCIUM 9.2 02/21/2018    ALBUMIN 4.50 02/21/2018    AST 30 02/21/2018    ALT 39 02/21/2018     Lab Results   Component Value Date    CHOL 191 01/22/2020    CHOL 188 10/08/2018     Lab Results   Component Value Date    TRIG 59 01/22/2020    TRIG 89 10/08/2018     Lab Results   Component Value Date    HDL 57 01/22/2020    HDL 63 10/08/2018     No components found for: LDLCALC  Lab Results   Component Value Date     (H) 01/22/2020     (H) 10/08/2018     No results found for: HDLLDLRATIO  No components found for: CHOLHDL  Lab Results   Component Value Date    HGBA1C 7.6 (H) 01/22/2020     Lab Results   Component Value Date    TSH 2.17 01/02/2017           ASSESSMENT AND PLAN  Mr. Gaspar is stable with no evidence of dizziness or recurrence of syncope.  He has been compliant with his medications and maintaining a high fluid intake.  No cardiac symptoms are reported at the present time.  I have continued antiplatelet therapy with aspirin, low-dose metoprolol succinate and midodrine has been continued.  Antihypertensive therapy with benazepril has been continued.    Diagnoses and all orders for this visit:    1. Atrial fibrillation, chronic (CMS/HCC) (Primary)  -     ECG 12 Lead    2. Essential hypertension    3. Mixed hyperlipidemia    4. Syncope and collapse        Patient's Body mass index is 30.73 kg/m². BMI is above normal parameters. Recommendations include: exercise counseling and nutrition counseling.  Patient is a  non-smoker      Angie Vogel MD  11/24/2020  10:29 CST

## 2021-02-01 ENCOUNTER — IMMUNIZATION (OUTPATIENT)
Dept: VACCINE CLINIC | Facility: HOSPITAL | Age: 66
End: 2021-02-01

## 2021-02-01 PROCEDURE — 91300 HC SARSCOV02 VAC 30MCG/0.3ML IM: CPT | Performed by: THORACIC SURGERY (CARDIOTHORACIC VASCULAR SURGERY)

## 2021-02-01 PROCEDURE — 0001A: CPT | Performed by: THORACIC SURGERY (CARDIOTHORACIC VASCULAR SURGERY)

## 2021-02-22 ENCOUNTER — IMMUNIZATION (OUTPATIENT)
Dept: VACCINE CLINIC | Facility: HOSPITAL | Age: 66
End: 2021-02-22

## 2021-02-22 PROCEDURE — 91300 HC SARSCOV02 VAC 30MCG/0.3ML IM: CPT | Performed by: THORACIC SURGERY (CARDIOTHORACIC VASCULAR SURGERY)

## 2021-02-22 PROCEDURE — 0002A: CPT | Performed by: THORACIC SURGERY (CARDIOTHORACIC VASCULAR SURGERY)

## 2021-05-24 ENCOUNTER — OFFICE VISIT (OUTPATIENT)
Dept: CARDIOLOGY | Facility: CLINIC | Age: 66
End: 2021-05-24

## 2021-05-24 VITALS
SYSTOLIC BLOOD PRESSURE: 134 MMHG | DIASTOLIC BLOOD PRESSURE: 70 MMHG | HEART RATE: 82 BPM | OXYGEN SATURATION: 98 % | HEIGHT: 72 IN | BODY MASS INDEX: 30.48 KG/M2 | WEIGHT: 225 LBS | TEMPERATURE: 97 F

## 2021-05-24 DIAGNOSIS — I10 ESSENTIAL HYPERTENSION: ICD-10-CM

## 2021-05-24 DIAGNOSIS — E78.2 MIXED HYPERLIPIDEMIA: ICD-10-CM

## 2021-05-24 DIAGNOSIS — R55 SYNCOPE AND COLLAPSE: Primary | ICD-10-CM

## 2021-05-24 PROCEDURE — 99213 OFFICE O/P EST LOW 20 MIN: CPT | Performed by: INTERNAL MEDICINE

## 2021-05-24 RX ORDER — ROSUVASTATIN CALCIUM 10 MG/1
10 TABLET, COATED ORAL DAILY
Qty: 90 TABLET | Refills: 3 | Status: SHIPPED | OUTPATIENT
Start: 2021-05-24 | End: 2021-11-15 | Stop reason: SDUPTHER

## 2021-05-24 NOTE — PROGRESS NOTES
Hema Gaspar  65 y.o. male    1. Syncope and collapse    2. Essential hypertension    3. Mixed hyperlipidemia        History of Present Illness  Mr. Gaspar is here for follow-up of his above-stated problems.  The patient denied any chest pain, shortness of breath, palpitation or any further episodes of syncope.  He has been compliant with his medication and has been maintaining a high fluid intake.    Echocardiogram in August 2019 showed normal LV systolic function with an EF of 61% with no significant valve abnormalities.  There was grade 1 diastolic dysfunction.  Aortic root was mildly dilated at 3.9 cm..  He had lab work done in January 2021 and LDL was 125.  Given his history of diabetes mellitus as per the guidelines, I believe that he would benefit from statins.    SUBJECTIVE    Allergies   Allergen Reactions   • Codeine Mental Status Change         Past Medical History:   Diagnosis Date   • Abnormal EKG    • Bradycardia    • Diabetes mellitus (CMS/HCC)    • Herpes zoster dermatitis of eyelids     OD, no globe involvement      • Hypertension    • Hypotension    • Precordial pain    • Syncope and collapse          Past Surgical History:   Procedure Laterality Date   • COLONOSCOPY N/A 4/10/2018    Procedure: COLONOSCOPY;  Surgeon: Trev Day DO;  [facility];  Service: Gastroenterology   • TONSILLECTOMY     • VASECTOMY           Family History   Problem Relation Age of Onset   • No Known Problems Mother    • Heart disease Father          Social History     Socioeconomic History   • Marital status:      Spouse name: Not on file   • Number of children: Not on file   • Years of education: Not on file   • Highest education level: Not on file   Tobacco Use   • Smoking status: Never Smoker   • Smokeless tobacco: Never Used   Substance and Sexual Activity   • Alcohol use: No   • Drug use: No   • Sexual activity: Defer         Current Outpatient Medications   Medication Sig  "Dispense Refill   • albuterol sulfate  (90 Base) MCG/ACT inhaler As Needed.     • aspirin 81 MG EC tablet Take 81 mg by mouth Daily.     • benazepril (LOTENSIN) 20 MG tablet Take 20 mg by mouth Daily.     • HM LANCETS MICRO THIN 33G misc      • magnesium oxide (MAGOX) 400 (241.3 MG) MG tablet tablet Take 400 mg by mouth Daily.     • meloxicam (MOBIC) 15 MG tablet Take 15 mg by mouth Daily.     • metFORMIN (GLUCOPHAGE) 1000 MG tablet Take 1,000 mg by mouth 2 (Two) Times a Day With Meals.     • metoprolol succinate XL (TOPROL-XL) 25 MG 24 hr tablet Take 1 tablet by mouth Daily. 90 tablet 1   • midodrine (PROAMATINE) 5 MG tablet Take 1 tablet by mouth 3 (Three) Times a Day. 90 tablet 6   • Omega-3 Fatty Acids (FISH OIL PO) Take  by mouth Daily.     • ipratropium (ATROVENT) 0.03 % nasal spray 2 sprays into the nostril(s) as directed by provider As Needed.     • rosuvastatin (CRESTOR) 10 MG tablet Take 1 tablet by mouth Daily. 90 tablet 3     No current facility-administered medications for this visit.         OBJECTIVE    /70 (BP Location: Left arm, Patient Position: Sitting, Cuff Size: Adult)   Pulse 82   Temp 97 °F (36.1 °C)   Ht 182.9 cm (72\")   Wt 102 kg (225 lb)   SpO2 98%   BMI 30.52 kg/m²         Review of Systems : The following systems are reviewed and no changes noted    Constitutional:  Denies recent weight loss, weight gain, fever or chills, no change in exercise tolerance     HENT:  Denies any hearing loss, epistaxis, hoarseness, or difficulty speaking.     Eyes: Wears eyeglasses or contact lenses     Respiratory:  Denies dyspnea with exertion,no cough, wheezing, or hemoptysis.     Cardiovascular: Negative for palpations, chest pain, orthopnea, PND, peripheral edema, syncope, or claudication.     Gastrointestinal:  Denies change in bowel habits, dyspepsia, ulcer disease, hematochezia, or melena.     Endocrine: Negative for cold intolerance, heat intolerance, polydipsia, polyphagia and " polyuria.    Genitourinary: Negative.      Musculoskeletal: Denies any history of arthritic symptoms or back problems      Neurological:  Denies any history of recurrent headaches, strokes, TIA, or seizure disorder.     Hematological: Denies any food allergies, seasonal allergies, bleeding disorders, or lymphadenopathy.     Psychiatric/Behavioral: Denies any history of depression, substance abuse, or change in cognitive function.         Physical Exam : The following systems are reviewed and no changes noted    Constitutional: Cooperative, alert and oriented,  in no acute distress.     HENT:   Head: Normocephalic, normal hair patterns, no masses or tenderness.  Ears, Nose, and Throat: No gross abnormalities. No pallor or cyanosis.  Eyes: EOMS intact, PERRL, conjunctivae and lids unremarkable. Fundoscopic exam and visual fields not performed.   Neck: No palpable masses or adenopathy, no thyromegaly, no JVD, carotid pulses are full and equal bilaterally and without  Bruits.     Cardiovascular: Regular rhythm, S1 and S2 normal, no S3 or S4.  No murmurs, gallops, or rubs detected.     Pulmonary/Chest: Chest: normal symmetry, no tenderness to palpation, normal respiratory excursion, no intercostal retraction, no use of accessory muscles.            Pulmonary: Normal breath sounds. No rales or ronchi.    Abdominal: Abdomen soft, bowel sounds normoactive, no masses, no hepatosplenomegaly, non-tender, no bruits.     Musculoskeletal: No deformities, clubbing, cyanosis, erythema, or edema observed.     Neurological: No gross motor or sensory deficits noted, affect appropriate, oriented to time, person, place.     Skin: Warm and dry to the touch, no apparent skin lesions or masses noted.     Psychiatric: He has a normal mood and affect. His behavior is normal. Judgment and thought content normal.         Procedures      Lab Results   Component Value Date    WBC 8.55 02/21/2018    HGB 13.2 (L) 02/21/2018    HCT 38.2 (L)  02/21/2018    MCV 84.5 02/21/2018     02/21/2018     Lab Results   Component Value Date    GLUCOSE 153 (H) 02/21/2018    BUN 18 02/21/2018    CREATININE 0.90 02/21/2018    EGFRIFNONA 86 02/21/2018    BCR 20.0 02/21/2018    CO2 25.0 02/21/2018    CALCIUM 9.2 02/21/2018    ALBUMIN 4.50 02/21/2018    AST 30 02/21/2018    ALT 39 02/21/2018     Lab Results   Component Value Date    CHOL 207 (H) 01/25/2021    CHOL 191 01/22/2020    CHOL 188 10/08/2018     Lab Results   Component Value Date    TRIG 80 01/25/2021    TRIG 59 01/22/2020    TRIG 89 10/08/2018     Lab Results   Component Value Date    HDL 66 01/25/2021    HDL 57 01/22/2020    HDL 63 10/08/2018     No components found for: LDLCALC  Lab Results   Component Value Date     (H) 01/25/2021     (H) 01/22/2020     (H) 10/08/2018     No results found for: HDLLDLRATIO  No components found for: CHOLHDL  Lab Results   Component Value Date    HGBA1C 7.6 (H) 01/25/2021     Lab Results   Component Value Date    TSH 2.17 01/02/2017           ASSESSMENT AND PLAN  Mr. Gaspar is stable with no evidence of dizziness or recurrence of syncope.  He has been compliant with his medications and maintaining a high fluid intake.  No cardiac symptoms are reported at the present time.  I have continued antiplatelet therapy with aspirin, low-dose metoprolol succinate and midodrine has been continued.  Antihypertensive therapy with benazepril has been continued.  As mentioned above, since he has diabetes mellitus, to optimize his LDL, I have started him on Crestor 10 mg daily.    Diagnoses and all orders for this visit:    1. Syncope and collapse (Primary)    2. Essential hypertension    3. Mixed hyperlipidemia    Other orders  -     rosuvastatin (CRESTOR) 10 MG tablet; Take 1 tablet by mouth Daily.  Dispense: 90 tablet; Refill: 3        Patient's Body mass index is 30.52 kg/m². BMI is above normal parameters. Recommendations include: exercise counseling and  nutrition counseling.  Patient is a non-smoker      Angie Vogel MD  5/24/2021  10:56 CDT

## 2021-11-15 ENCOUNTER — OFFICE VISIT (OUTPATIENT)
Dept: CARDIOLOGY | Facility: CLINIC | Age: 66
End: 2021-11-15

## 2021-11-15 VITALS
OXYGEN SATURATION: 95 % | WEIGHT: 224 LBS | SYSTOLIC BLOOD PRESSURE: 138 MMHG | BODY MASS INDEX: 30.34 KG/M2 | HEIGHT: 72 IN | DIASTOLIC BLOOD PRESSURE: 74 MMHG | TEMPERATURE: 97.3 F | HEART RATE: 63 BPM

## 2021-11-15 DIAGNOSIS — E78.2 MIXED HYPERLIPIDEMIA: ICD-10-CM

## 2021-11-15 DIAGNOSIS — R55 SYNCOPE AND COLLAPSE: ICD-10-CM

## 2021-11-15 DIAGNOSIS — I10 ESSENTIAL HYPERTENSION: Primary | ICD-10-CM

## 2021-11-15 DIAGNOSIS — I95.0 IDIOPATHIC HYPOTENSION: ICD-10-CM

## 2021-11-15 DIAGNOSIS — I10 PRIMARY HYPERTENSION: ICD-10-CM

## 2021-11-15 PROCEDURE — 93000 ELECTROCARDIOGRAM COMPLETE: CPT | Performed by: INTERNAL MEDICINE

## 2021-11-15 PROCEDURE — 99213 OFFICE O/P EST LOW 20 MIN: CPT | Performed by: INTERNAL MEDICINE

## 2021-11-15 RX ORDER — CEFPROZIL 500 MG/1
TABLET, FILM COATED ORAL
COMMUNITY
Start: 2021-10-15 | End: 2021-11-15

## 2021-11-15 RX ORDER — MIDODRINE HYDROCHLORIDE 5 MG/1
5 TABLET ORAL 3 TIMES DAILY
Qty: 180 TABLET | Refills: 3 | Status: SHIPPED | OUTPATIENT
Start: 2021-11-15

## 2021-11-15 RX ORDER — METOPROLOL SUCCINATE 25 MG/1
25 TABLET, EXTENDED RELEASE ORAL DAILY
Qty: 90 TABLET | Refills: 3 | Status: SHIPPED | OUTPATIENT
Start: 2021-11-15 | End: 2023-03-08 | Stop reason: SDUPTHER

## 2021-11-15 RX ORDER — ROSUVASTATIN CALCIUM 10 MG/1
10 TABLET, COATED ORAL DAILY
Qty: 90 TABLET | Refills: 3 | Status: SHIPPED | OUTPATIENT
Start: 2021-11-15 | End: 2023-03-09

## 2021-11-15 NOTE — PROGRESS NOTES
Hema Gaspar  66 y.o. male    1. Essential hypertension    2. Syncope and collapse    3. Idiopathic hypotension    4. Primary hypertension    5. Mixed hyperlipidemia        History of Present Illness  Mr. Gaspar is here for follow-up of his above-stated problems.  The patient denied any chest pain, shortness of breath, palpitation or any further episodes of syncope.  He has been compliant with his medication and has been maintaining a high fluid intake.    Clinical exam today was unremarkable and his heart rate and blood pressure were in the normal range.    Echocardiogram in August 2019 showed normal LV systolic function with an EF of 61% with no significant valve abnormalities.  There was grade 1 diastolic dysfunction.  Aortic root was mildly dilated at 3.9 cm..    EKG showed sinus rhythm with borderline first-degree AV block.  NY interval 232 ms.  Right bundle branch block.  Baseline artifacts.    Allergies   Allergen Reactions   • Codeine Mental Status Change         Past Medical History:   Diagnosis Date   • Abnormal EKG    • Bradycardia    • Diabetes mellitus (HCC)    • Herpes zoster dermatitis of eyelids     OD, no globe involvement      • Hypertension    • Hypotension    • Precordial pain    • Syncope and collapse          Past Surgical History:   Procedure Laterality Date   • COLONOSCOPY N/A 4/10/2018    Procedure: COLONOSCOPY;  Surgeon: Trev Day DO;  Location: [facility] ENDOSCOPY;  Service: Gastroenterology   • TONSILLECTOMY     • VASECTOMY           Family History   Problem Relation Age of Onset   • No Known Problems Mother    • Heart disease Father          Social History     Socioeconomic History   • Marital status:    Tobacco Use   • Smoking status: Never Smoker   • Smokeless tobacco: Never Used   Substance and Sexual Activity   • Alcohol use: No   • Drug use: No   • Sexual activity: Defer         Current Outpatient Medications   Medication Sig Dispense Refill   • aspirin 81  "MG EC tablet Take 81 mg by mouth Daily.     • benazepril (LOTENSIN) 20 MG tablet Take 20 mg by mouth Daily.     • HM LANCETS MICRO THIN 33G misc      • magnesium oxide (MAGOX) 400 (241.3 MG) MG tablet tablet Take 400 mg by mouth Daily.     • meloxicam (MOBIC) 15 MG tablet Take 15 mg by mouth Daily.     • metFORMIN (GLUCOPHAGE) 1000 MG tablet Take 1,000 mg by mouth 2 (Two) Times a Day With Meals.     • Omega-3 Fatty Acids (FISH OIL PO) Take  by mouth Daily.     • albuterol sulfate  (90 Base) MCG/ACT inhaler As Needed.     • ipratropium (ATROVENT) 0.03 % nasal spray 2 sprays into the nostril(s) as directed by provider As Needed.     • metoprolol succinate XL (TOPROL-XL) 25 MG 24 hr tablet Take 1 tablet by mouth Daily. 90 tablet 3   • midodrine (PROAMATINE) 5 MG tablet Take 1 tablet by mouth 3 (Three) Times a Day. 180 tablet 3   • rosuvastatin (CRESTOR) 10 MG tablet Take 1 tablet by mouth Daily. 90 tablet 3     No current facility-administered medications for this visit.         OBJECTIVE    /74 (BP Location: Left arm, Patient Position: Sitting, Cuff Size: Adult)   Pulse 63   Temp 97.3 °F (36.3 °C)   Ht 182.9 cm (72\")   Wt 102 kg (224 lb)   SpO2 95%   BMI 30.38 kg/m²         Review of Systems : The following systems are reviewed and no changes noted    Constitutional:  Denies recent weight loss, weight gain, fever or chills, no change in exercise tolerance     HENT:  Denies any hearing loss, epistaxis, hoarseness, or difficulty speaking.     Eyes: Wears eyeglasses or contact lenses     Respiratory:  Denies dyspnea with exertion,no cough, wheezing, or hemoptysis.     Cardiovascular: Negative for palpations, chest pain, orthopnea, PND, peripheral edema, syncope, or claudication.     Gastrointestinal:  Denies change in bowel habits, dyspepsia, ulcer disease, hematochezia, or melena.     Endocrine: Negative for cold intolerance, heat intolerance, polydipsia, polyphagia and polyuria.    Genitourinary: " Negative.      Musculoskeletal: Denies any history of arthritic symptoms or back problems      Neurological:  Denies any history of recurrent headaches, strokes, TIA, or seizure disorder.     Hematological: Denies any food allergies, seasonal allergies, bleeding disorders, or lymphadenopathy.     Psychiatric/Behavioral: Denies any history of depression, substance abuse, or change in cognitive function.         Physical Exam : The following systems are reviewed and no changes noted    Constitutional: Cooperative, alert and oriented,  in no acute distress.     HENT:   Head: Normocephalic, normal hair patterns, no masses or tenderness.  Ears, Nose, and Throat: No gross abnormalities. No pallor or cyanosis.  Eyes: EOMS intact, PERRL, conjunctivae and lids unremarkable. Fundoscopic exam and visual fields not performed.   Neck: No palpable masses or adenopathy, no thyromegaly, no JVD, carotid pulses are full and equal bilaterally and without  Bruits.     Cardiovascular: Regular rhythm, S1 and S2 normal, no S3 or S4.  No murmurs, gallops, or rubs detected.     Pulmonary/Chest: Chest: normal symmetry, no tenderness to palpation, normal respiratory excursion, no intercostal retraction, no use of accessory muscles.            Pulmonary: Normal breath sounds. No rales or ronchi.    Abdominal: Abdomen soft, bowel sounds normoactive, no masses, no hepatosplenomegaly, non-tender, no bruits.     Musculoskeletal: No deformities, clubbing, cyanosis, erythema, or edema observed.     Neurological: No gross motor or sensory deficits noted, affect appropriate, oriented to time, person, place.     Skin: Warm and dry to the touch, no apparent skin lesions or masses noted.     Psychiatric: He has a normal mood and affect. His behavior is normal. Judgment and thought content normal.         Procedures      Lab Results   Component Value Date    WBC 8.55 02/21/2018    HGB 13.2 (L) 02/21/2018    HCT 38.2 (L) 02/21/2018    MCV 84.5 02/21/2018      02/21/2018     Lab Results   Component Value Date    GLUCOSE 153 (H) 02/21/2018    BUN 18 02/21/2018    CREATININE 0.90 02/21/2018    EGFRIFNONA 86 02/21/2018    BCR 20.0 02/21/2018    CO2 25.0 02/21/2018    CALCIUM 9.2 02/21/2018    ALBUMIN 4.50 02/21/2018    AST 30 02/21/2018    ALT 39 02/21/2018     Lab Results   Component Value Date    CHOL 207 (H) 01/25/2021    CHOL 191 01/22/2020    CHOL 188 10/08/2018     Lab Results   Component Value Date    TRIG 80 01/25/2021    TRIG 59 01/22/2020    TRIG 89 10/08/2018     Lab Results   Component Value Date    HDL 66 01/25/2021    HDL 57 01/22/2020    HDL 63 10/08/2018     No components found for: LDLCALC  Lab Results   Component Value Date     (H) 01/25/2021     (H) 01/22/2020     (H) 10/08/2018     No results found for: HDLLDLRATIO  No components found for: CHOLHDL  Lab Results   Component Value Date    HGBA1C 7.6 (H) 01/25/2021     Lab Results   Component Value Date    TSH 2.17 01/02/2017           ASSESSMENT AND PLAN  Mr. Gaspar is stable with no evidence of dizziness or recurrence of syncope.  He has been compliant with his medications and maintaining a high fluid intake.  No cardiac symptoms are reported at the present time.    I have continued antiplatelet therapy with aspirin, low-dose metoprolol succinate and midodrine has been continued.  Antihypertensive therapy with benazepril has been continued.  He has an appointment coming up with his primary care physician and will have blood work done at that time.  His LDL was 125 and hemoglobin A1c 7.6 in January 2021.  This will be rechecked.    Diagnoses and all orders for this visit:    1. Essential hypertension (Primary)  -     ECG 12 Lead    2. Syncope and collapse    3. Idiopathic hypotension    4. Primary hypertension    5. Mixed hyperlipidemia        Patient's Body mass index is 30.38 kg/m². BMI is above normal parameters. Recommendations include: exercise counseling and  nutrition counseling.  Patient is a non-smoker      Angie Vogel MD  11/15/2021  09:10 CST

## 2021-11-16 LAB
QT INTERVAL: 410 MS
QTC INTERVAL: 419 MS

## 2022-05-18 ENCOUNTER — OFFICE VISIT (OUTPATIENT)
Dept: CARDIOLOGY | Facility: CLINIC | Age: 67
End: 2022-05-18

## 2022-05-18 VITALS
WEIGHT: 213 LBS | BODY MASS INDEX: 28.85 KG/M2 | HEIGHT: 72 IN | OXYGEN SATURATION: 98 % | SYSTOLIC BLOOD PRESSURE: 136 MMHG | DIASTOLIC BLOOD PRESSURE: 74 MMHG | TEMPERATURE: 97.1 F | HEART RATE: 66 BPM

## 2022-05-18 DIAGNOSIS — I10 PRIMARY HYPERTENSION: ICD-10-CM

## 2022-05-18 DIAGNOSIS — I95.0 IDIOPATHIC HYPOTENSION: ICD-10-CM

## 2022-05-18 DIAGNOSIS — E78.2 MIXED HYPERLIPIDEMIA: ICD-10-CM

## 2022-05-18 DIAGNOSIS — E11.649 TYPE 2 DIABETES MELLITUS WITH HYPOGLYCEMIA WITHOUT COMA, WITHOUT LONG-TERM CURRENT USE OF INSULIN: ICD-10-CM

## 2022-05-18 DIAGNOSIS — R55 SYNCOPE AND COLLAPSE: Primary | ICD-10-CM

## 2022-05-18 PROCEDURE — 99214 OFFICE O/P EST MOD 30 MIN: CPT | Performed by: INTERNAL MEDICINE

## 2022-05-18 RX ORDER — CYANOCOBALAMIN 1000 UG/ML
1000 INJECTION, SOLUTION INTRAMUSCULAR; SUBCUTANEOUS
COMMUNITY
End: 2023-03-08

## 2022-05-18 NOTE — PROGRESS NOTES
Hema Gaspar  66 y.o. male    1. Syncope and collapse    2. Primary hypertension    3. Idiopathic hypotension    4. Type 2 diabetes mellitus with hypoglycemia without coma, without long-term current use of insulin (HCC)    5. Mixed hyperlipidemia        History of Present Illness  Mr. Gaspar is here for follow-up of his above-stated problems.  The patient denied any cardiac symptoms and has made a conscious attempt to exercise, watch his diet and lose weight.  He has not had any further episodes of syncope.    Clinical exam today was unremarkable and his heart rate and blood pressure were in the normal range.    Echocardiogram in August 2019 showed normal LV systolic function with an EF of 61% with no significant valve abnormalities.  There was grade 1 diastolic dysfunction.  Aortic root was mildly dilated at 3.9 cm..    He had lab work done by Dr. Taylor recently and the results were reviewed.  Liver function tests were within normal limits.  LDL was 61 and HDL 53.  Hemoglobin A1c was elevated at 7.9    Allergies   Allergen Reactions   • Codeine Mental Status Change         Past Medical History:   Diagnosis Date   • Abnormal EKG    • Bradycardia    • Diabetes mellitus (HCC)    • Herpes zoster dermatitis of eyelids     OD, no globe involvement      • Hypertension    • Hypotension    • Precordial pain    • Syncope and collapse          Past Surgical History:   Procedure Laterality Date   • COLONOSCOPY N/A 4/10/2018    Procedure: COLONOSCOPY;  Surgeon: Trev Day DO;  Location: [facility] ENDOSCOPY;  Service: Gastroenterology   • TONSILLECTOMY     • VASECTOMY           Family History   Problem Relation Age of Onset   • No Known Problems Mother    • Heart disease Father          Social History     Socioeconomic History   • Marital status:    Tobacco Use   • Smoking status: Never Smoker   • Smokeless tobacco: Never Used   Substance and Sexual Activity   • Alcohol use: No   • Drug use: No   •  "Sexual activity: Defer         Current Outpatient Medications   Medication Sig Dispense Refill   • albuterol sulfate  (90 Base) MCG/ACT inhaler As Needed.     • aspirin 81 MG EC tablet Take 81 mg by mouth Daily.     • benazepril (LOTENSIN) 20 MG tablet Take 20 mg by mouth Daily.     • cyanocobalamin 1000 MCG/ML injection Inject 1,000 mcg into the appropriate muscle as directed by prescriber.     • HM LANCETS MICRO THIN 33G misc      • magnesium oxide (MAGOX) 400 (241.3 MG) MG tablet tablet Take 400 mg by mouth Daily.     • meloxicam (MOBIC) 15 MG tablet Take 15 mg by mouth Daily.     • metFORMIN (GLUCOPHAGE) 1000 MG tablet Take 1,000 mg by mouth 2 (Two) Times a Day With Meals.     • metoprolol succinate XL (TOPROL-XL) 25 MG 24 hr tablet Take 1 tablet by mouth Daily. 90 tablet 3   • midodrine (PROAMATINE) 5 MG tablet Take 1 tablet by mouth 3 (Three) Times a Day. 180 tablet 3   • Omega-3 Fatty Acids (FISH OIL PO) Take  by mouth Daily.     • rosuvastatin (CRESTOR) 10 MG tablet Take 1 tablet by mouth Daily. 90 tablet 3     No current facility-administered medications for this visit.         OBJECTIVE    /74 (BP Location: Left arm, Patient Position: Sitting, Cuff Size: Adult)   Pulse 66   Temp 97.1 °F (36.2 °C)   Ht 182.9 cm (72\")   Wt 96.6 kg (213 lb)   SpO2 98%   BMI 28.89 kg/m²         Review of Systems : The following systems are reviewed and no changes noted    Constitutional:  Denies recent weight loss, weight gain, fever or chills, no change in exercise tolerance     HENT:  Denies any hearing loss, epistaxis, hoarseness, or difficulty speaking.     Eyes: Wears eyeglasses or contact lenses     Respiratory:  Denies dyspnea with exertion,no cough, wheezing, or hemoptysis.     Cardiovascular: Negative for palpations, chest pain, orthopnea, PND, peripheral edema, syncope, or claudication.     Gastrointestinal:  Denies change in bowel habits, dyspepsia, ulcer disease, hematochezia, or melena. "     Endocrine: Negative for cold intolerance, heat intolerance, polydipsia, polyphagia and polyuria.    Genitourinary: Negative.      Musculoskeletal: Denies any history of arthritic symptoms or back problems      Neurological:  Denies any history of recurrent headaches, strokes, TIA, or seizure disorder.     Hematological: Denies any food allergies, seasonal allergies, bleeding disorders, or lymphadenopathy.     Psychiatric/Behavioral: Denies any history of depression, substance abuse, or change in cognitive function.         Physical Exam : The following systems are reviewed and no changes noted    Constitutional: Cooperative, alert and oriented,  in no acute distress.     HENT:   Head: Normocephalic, normal hair patterns, no masses or tenderness.  Ears, Nose, and Throat: No gross abnormalities. No pallor or cyanosis.  Eyes: EOMS intact, PERRL, conjunctivae and lids unremarkable. Fundoscopic exam and visual fields not performed.   Neck: No palpable masses or adenopathy, no thyromegaly, no JVD, carotid pulses are full and equal bilaterally and without  Bruits.     Cardiovascular: Regular rhythm, S1 and S2 normal, no S3 or S4.  No murmurs, gallops, or rubs detected.     Pulmonary/Chest: Chest: normal symmetry, no tenderness to palpation, normal respiratory excursion, no intercostal retraction, no use of accessory muscles.            Pulmonary: Normal breath sounds. No rales or ronchi.    Abdominal: Abdomen soft, bowel sounds normoactive, no masses, no hepatosplenomegaly, non-tender, no bruits.     Musculoskeletal: No deformities, clubbing, cyanosis, erythema, or edema observed.     Neurological: No gross motor or sensory deficits noted, affect appropriate, oriented to time, person, place.     Skin: Warm and dry to the touch, no apparent skin lesions or masses noted.     Psychiatric: He has a normal mood and affect. His behavior is normal. Judgment and thought content normal.         Procedures      Lab Results    Component Value Date    WBC 8.55 02/21/2018    HGB 13.2 (L) 02/21/2018    HCT 38.2 (L) 02/21/2018    MCV 84.5 02/21/2018     02/21/2018     Lab Results   Component Value Date    GLUCOSE 153 (H) 02/21/2018    BUN 23 05/03/2022    CREATININE 0.9 05/03/2022    EGFRIFNONA 86 02/21/2018    BCR 20.0 02/21/2018    CO2 27 05/03/2022    CALCIUM 9.6 05/03/2022    ALBUMIN 4.6 05/03/2022    AST 17 05/03/2022    ALT 12 05/03/2022     Lab Results   Component Value Date    CHOL 207 (H) 01/25/2021    CHOL 191 01/22/2020    CHOL 188 10/08/2018     Lab Results   Component Value Date    TRIG 63 05/03/2022    TRIG 87 01/24/2022    TRIG 80 01/25/2021     Lab Results   Component Value Date    HDL 53 05/03/2022    HDL 56 01/24/2022    HDL 66 01/25/2021     No components found for: LDLCALC  Lab Results   Component Value Date    LDL 61 05/03/2022    LDL 45 01/24/2022     (H) 01/25/2021     No results found for: HDLLDLRATIO  No components found for: CHOLHDL  Lab Results   Component Value Date    HGBA1C 7.9 (H) 05/03/2022     Lab Results   Component Value Date    TSH 2.74 01/24/2022           ASSESSMENT AND PLAN  Mr. Gaspar is stable with no evidence of dizziness or recurrence of syncope.  He has been compliant with his medications and maintaining a high fluid intake.  No cardiac symptoms are reported at the present time.    I have continued antiplatelet therapy with aspirin, low-dose metoprolol succinate and midodrine.   Antihypertensive therapy with benazepril has been continued.   We had a discussion about appropriate diet and patient is making a conscious attempt.    Diagnoses and all orders for this visit:    1. Syncope and collapse (Primary)    2. Primary hypertension    3. Idiopathic hypotension    4. Type 2 diabetes mellitus with hypoglycemia without coma, without long-term current use of insulin (HCC)    5. Mixed hyperlipidemia        Patient's Body mass index is 28.89 kg/m². BMI is above normal parameters.  Recommendations include: exercise counseling and nutrition counseling.  Patient is a non-smoker      Angie Vogel MD  5/18/2022  09:26 CDT

## 2022-11-21 ENCOUNTER — OFFICE VISIT (OUTPATIENT)
Dept: CARDIOLOGY | Facility: CLINIC | Age: 67
End: 2022-11-21

## 2022-11-21 VITALS
BODY MASS INDEX: 29.12 KG/M2 | HEIGHT: 72 IN | TEMPERATURE: 98 F | SYSTOLIC BLOOD PRESSURE: 148 MMHG | DIASTOLIC BLOOD PRESSURE: 84 MMHG | WEIGHT: 215 LBS | HEART RATE: 62 BPM | OXYGEN SATURATION: 98 %

## 2022-11-21 DIAGNOSIS — E78.2 MIXED HYPERLIPIDEMIA: ICD-10-CM

## 2022-11-21 DIAGNOSIS — R55 SYNCOPE AND COLLAPSE: Primary | ICD-10-CM

## 2022-11-21 DIAGNOSIS — I10 PRIMARY HYPERTENSION: ICD-10-CM

## 2022-11-21 PROCEDURE — 99213 OFFICE O/P EST LOW 20 MIN: CPT | Performed by: INTERNAL MEDICINE

## 2022-11-21 PROCEDURE — 93000 ELECTROCARDIOGRAM COMPLETE: CPT | Performed by: INTERNAL MEDICINE

## 2022-11-21 NOTE — PROGRESS NOTES
Hema Gaspar  67 y.o. male    1. Syncope and collapse    2. Primary hypertension    3. Mixed hyperlipidemia        History of Present Illness  Mr. Gaspar is here for follow-up of his above-stated problems.  He denied any cardiac symptoms no chest pain, shortness of breath or palpitation.  He has not had any further episodes of dizziness or syncope..    Echocardiogram in August 2019 showed normal LV systolic function with an EF of 61% with no significant valve abnormalities.  There was grade 1 diastolic dysfunction.  Aortic root was mildly dilated at 3.9 cm..    EKG today showed sinus rhythm with heart rate of 62 bpm.  VA interval 2 8 ms.  Right bundle branch block.  Unchanged from previous EKGs.    Allergies   Allergen Reactions   • Codeine Mental Status Change         Past Medical History:   Diagnosis Date   • Abnormal EKG    • Bradycardia    • Diabetes mellitus (HCC)    • Herpes zoster dermatitis of eyelids     OD, no globe involvement      • Hypertension    • Hypotension    • Precordial pain    • Syncope and collapse          Past Surgical History:   Procedure Laterality Date   • COLONOSCOPY N/A 4/10/2018    Procedure: COLONOSCOPY;  Surgeon: Trev Day DO;  Location: [facility] ENDOSCOPY;  Service: Gastroenterology   • TONSILLECTOMY     • VASECTOMY           Family History   Problem Relation Age of Onset   • No Known Problems Mother    • Heart disease Father          Social History     Socioeconomic History   • Marital status:    Tobacco Use   • Smoking status: Never   • Smokeless tobacco: Never   Substance and Sexual Activity   • Alcohol use: No   • Drug use: No   • Sexual activity: Defer         Current Outpatient Medications   Medication Sig Dispense Refill   • albuterol sulfate  (90 Base) MCG/ACT inhaler As Needed.     • aspirin 81 MG EC tablet Take 81 mg by mouth Daily.     • benazepril (LOTENSIN) 20 MG tablet Take 20 mg by mouth Daily.     • magnesium oxide (MAGOX) 400 (241.3  "MG) MG tablet tablet Take 400 mg by mouth Daily.     • meloxicam (MOBIC) 15 MG tablet Take 15 mg by mouth Daily.     • metFORMIN (GLUCOPHAGE) 1000 MG tablet Take 1,000 mg by mouth 2 (Two) Times a Day With Meals.     • metoprolol succinate XL (TOPROL-XL) 25 MG 24 hr tablet Take 1 tablet by mouth Daily. 90 tablet 3   • midodrine (PROAMATINE) 5 MG tablet Take 1 tablet by mouth 3 (Three) Times a Day. 180 tablet 3   • Omega-3 Fatty Acids (FISH OIL PO) Take  by mouth Daily.     • rosuvastatin (CRESTOR) 10 MG tablet Take 1 tablet by mouth Daily. 90 tablet 3   • cyanocobalamin 1000 MCG/ML injection Inject 1,000 mcg into the appropriate muscle as directed by prescriber.     •  LANCETS MICRO THIN 33G misc        No current facility-administered medications for this visit.         OBJECTIVE    /84 (BP Location: Left arm, Patient Position: Sitting, Cuff Size: Adult)   Pulse 62   Temp 98 °F (36.7 °C)   Ht 182.9 cm (72\")   Wt 97.5 kg (215 lb)   SpO2 98%   BMI 29.16 kg/m²         Review of Systems : The following systems are reviewed and no changes noted    Constitutional:  Denies recent weight loss, weight gain, fever or chills, no change in exercise tolerance     HENT:  Denies any hearing loss, epistaxis, hoarseness, or difficulty speaking.     Eyes: Wears eyeglasses or contact lenses     Respiratory:  Denies dyspnea with exertion,no cough, wheezing, or hemoptysis.     Cardiovascular: Negative for palpations, chest pain, orthopnea, PND, peripheral edema, syncope, or claudication.     Gastrointestinal:  Denies change in bowel habits, dyspepsia, ulcer disease, hematochezia, or melena.     Endocrine: Negative for cold intolerance, heat intolerance, polydipsia, polyphagia and polyuria.    Genitourinary: Negative.      Musculoskeletal: Denies any history of arthritic symptoms or back problems      Neurological:  Denies any history of recurrent headaches, strokes, TIA, or seizure disorder.     Hematological: Denies any " food allergies, seasonal allergies, bleeding disorders, or lymphadenopathy.     Psychiatric/Behavioral: Denies any history of depression, substance abuse, or change in cognitive function.         Physical Exam : The following systems are reviewed and no changes noted    Constitutional: Cooperative, alert and oriented,  in no acute distress.     HENT:   Head: Normocephalic, normal hair patterns, no masses or tenderness.  Ears, Nose, and Throat: No gross abnormalities. No pallor or cyanosis.  Eyes: EOMS intact, PERRL, conjunctivae and lids unremarkable. Fundoscopic exam and visual fields not performed.   Neck: No palpable masses or adenopathy, no thyromegaly, no JVD, carotid pulses are full and equal bilaterally and without  Bruits.     Cardiovascular: Regular rhythm, S1 and S2 normal, no S3 or S4.  No murmurs, gallops, or rubs detected.     Pulmonary/Chest: Chest: normal symmetry, no tenderness to palpation, normal respiratory excursion, no intercostal retraction, no use of accessory muscles.            Pulmonary: Normal breath sounds. No rales or ronchi.    Abdominal: Abdomen soft, bowel sounds normoactive, no masses, no hepatosplenomegaly, non-tender, no bruits.     Musculoskeletal: No deformities, clubbing, cyanosis, erythema, or edema observed.     Neurological: No gross motor or sensory deficits noted, affect appropriate, oriented to time, person, place.     Skin: Warm and dry to the touch, no apparent skin lesions or masses noted.     Psychiatric: He has a normal mood and affect. His behavior is normal. Judgment and thought content normal.         Procedures      Lab Results   Component Value Date    WBC 8.55 02/21/2018    HGB 13.2 (L) 02/21/2018    HCT 38.2 (L) 02/21/2018    MCV 84.5 02/21/2018     02/21/2018     Lab Results   Component Value Date    GLUCOSE 153 (H) 02/21/2018    BUN 23 05/03/2022    CREATININE 0.9 05/03/2022    EGFRIFNONA 86 02/21/2018    BCR 20.0 02/21/2018    CO2 27 05/03/2022     CALCIUM 9.6 05/03/2022    ALBUMIN 4.6 05/03/2022    AST 17 05/03/2022    ALT 12 05/03/2022     Lab Results   Component Value Date    CHOL 207 (H) 01/25/2021    CHOL 191 01/22/2020    CHOL 188 10/08/2018     Lab Results   Component Value Date    TRIG 63 05/03/2022    TRIG 87 01/24/2022    TRIG 80 01/25/2021     Lab Results   Component Value Date    HDL 53 05/03/2022    HDL 56 01/24/2022    HDL 66 01/25/2021     No components found for: LDLCALC  Lab Results   Component Value Date    LDL 61 05/03/2022    LDL 45 01/24/2022     (H) 01/25/2021     No results found for: HDLLDLRATIO  No components found for: CHOLHDL  Lab Results   Component Value Date    HGBA1C 7.9 (H) 05/03/2022     Lab Results   Component Value Date    TSH 2.74 01/24/2022           ASSESSMENT AND PLAN  Mr. Gaspar is stable with no evidence of dizziness or recurrence of syncope.  His lab results from May 2022 were reviewed.  LDL was 61 and HDL 53.  He has been advised to maintain a high fluid intake and watch his diet closely.    I have continued antiplatelet therapy with aspirin, low-dose metoprolol succinate and midodrine.   Antihypertensive therapy with benazepril has been continued.     Diagnoses and all orders for this visit:    1. Syncope and collapse (Primary)  -     ECG 12 Lead    2. Primary hypertension    3. Mixed hyperlipidemia        Patient's Body mass index is 29.16 kg/m². BMI is above normal parameters. Recommendations include: exercise counseling and nutrition counseling.  Patient is a non-smoker      Angie Vogel MD  11/21/2022  12:11 CST

## 2022-11-22 LAB
QT INTERVAL: 418 MS
QTC INTERVAL: 424 MS

## 2023-03-08 ENCOUNTER — OFFICE VISIT (OUTPATIENT)
Dept: FAMILY MEDICINE CLINIC | Facility: CLINIC | Age: 68
End: 2023-03-08
Payer: COMMERCIAL

## 2023-03-08 ENCOUNTER — LAB (OUTPATIENT)
Dept: LAB | Facility: HOSPITAL | Age: 68
End: 2023-03-08
Payer: COMMERCIAL

## 2023-03-08 VITALS
TEMPERATURE: 97.1 F | DIASTOLIC BLOOD PRESSURE: 88 MMHG | WEIGHT: 200.4 LBS | HEART RATE: 80 BPM | SYSTOLIC BLOOD PRESSURE: 120 MMHG | HEIGHT: 72 IN | RESPIRATION RATE: 18 BRPM | OXYGEN SATURATION: 97 % | BODY MASS INDEX: 27.14 KG/M2

## 2023-03-08 DIAGNOSIS — E78.2 MIXED HYPERLIPIDEMIA: ICD-10-CM

## 2023-03-08 DIAGNOSIS — Z12.5 PROSTATE CANCER SCREENING: ICD-10-CM

## 2023-03-08 DIAGNOSIS — Z00.00 ANNUAL PHYSICAL EXAM: ICD-10-CM

## 2023-03-08 DIAGNOSIS — M54.2 NECK PAIN ON LEFT SIDE: ICD-10-CM

## 2023-03-08 DIAGNOSIS — Z11.59 NEED FOR HEPATITIS C SCREENING TEST: ICD-10-CM

## 2023-03-08 DIAGNOSIS — E11.9 TYPE 2 DIABETES MELLITUS WITHOUT COMPLICATION, WITHOUT LONG-TERM CURRENT USE OF INSULIN: ICD-10-CM

## 2023-03-08 DIAGNOSIS — I10 PRIMARY HYPERTENSION: ICD-10-CM

## 2023-03-08 DIAGNOSIS — Z76.89 ENCOUNTER TO ESTABLISH CARE: Primary | ICD-10-CM

## 2023-03-08 DIAGNOSIS — Z23 NEED FOR PNEUMOCOCCAL VACCINATION: ICD-10-CM

## 2023-03-08 PROBLEM — R55 SYNCOPE AND COLLAPSE: Status: ACTIVE | Noted: 2021-03-24

## 2023-03-08 PROBLEM — R00.1 BRADYCARDIA: Status: ACTIVE | Noted: 2021-03-24

## 2023-03-08 PROBLEM — R07.2 PRECORDIAL PAIN: Status: ACTIVE | Noted: 2021-03-24

## 2023-03-08 PROBLEM — R94.31 ABNORMAL EKG: Status: ACTIVE | Noted: 2021-03-24

## 2023-03-08 LAB
ALBUMIN SERPL-MCNC: 4.5 G/DL (ref 3.5–5.2)
ALBUMIN/GLOB SERPL: 1.5 G/DL
ALP SERPL-CCNC: 49 U/L (ref 39–117)
ALT SERPL W P-5'-P-CCNC: 17 U/L (ref 1–41)
ANION GAP SERPL CALCULATED.3IONS-SCNC: 12 MMOL/L (ref 5–15)
AST SERPL-CCNC: 18 U/L (ref 1–40)
BASOPHILS # BLD AUTO: 0.04 10*3/MM3 (ref 0–0.2)
BASOPHILS NFR BLD AUTO: 0.5 % (ref 0–1.5)
BILIRUB SERPL-MCNC: 0.7 MG/DL (ref 0–1.2)
BUN SERPL-MCNC: 15 MG/DL (ref 8–23)
BUN/CREAT SERPL: 14.7 (ref 7–25)
CALCIUM SPEC-SCNC: 9.8 MG/DL (ref 8.6–10.5)
CHLORIDE SERPL-SCNC: 101 MMOL/L (ref 98–107)
CHOLEST SERPL-MCNC: 179 MG/DL (ref 0–200)
CO2 SERPL-SCNC: 26 MMOL/L (ref 22–29)
CREAT SERPL-MCNC: 1.02 MG/DL (ref 0.76–1.27)
DEPRECATED RDW RBC AUTO: 42 FL (ref 37–54)
EGFRCR SERPLBLD CKD-EPI 2021: 80.6 ML/MIN/1.73
EOSINOPHIL # BLD AUTO: 0.08 10*3/MM3 (ref 0–0.4)
EOSINOPHIL NFR BLD AUTO: 1 % (ref 0.3–6.2)
ERYTHROCYTE [DISTWIDTH] IN BLOOD BY AUTOMATED COUNT: 13.4 % (ref 12.3–15.4)
GLOBULIN UR ELPH-MCNC: 3 GM/DL
GLUCOSE SERPL-MCNC: 118 MG/DL (ref 65–99)
HBA1C MFR BLD: 8.1 % (ref 4.8–5.6)
HCT VFR BLD AUTO: 40.8 % (ref 37.5–51)
HCV AB SER DONR QL: NORMAL
HDLC SERPL-MCNC: 57 MG/DL (ref 40–60)
HGB BLD-MCNC: 14.2 G/DL (ref 13–17.7)
IMM GRANULOCYTES # BLD AUTO: 0.03 10*3/MM3 (ref 0–0.05)
IMM GRANULOCYTES NFR BLD AUTO: 0.4 % (ref 0–0.5)
LDLC SERPL CALC-MCNC: 102 MG/DL (ref 0–100)
LDLC/HDLC SERPL: 1.76 {RATIO}
LYMPHOCYTES # BLD AUTO: 1.82 10*3/MM3 (ref 0.7–3.1)
LYMPHOCYTES NFR BLD AUTO: 23.6 % (ref 19.6–45.3)
MCH RBC QN AUTO: 30.1 PG (ref 26.6–33)
MCHC RBC AUTO-ENTMCNC: 34.8 G/DL (ref 31.5–35.7)
MCV RBC AUTO: 86.6 FL (ref 79–97)
MONOCYTES # BLD AUTO: 0.56 10*3/MM3 (ref 0.1–0.9)
MONOCYTES NFR BLD AUTO: 7.3 % (ref 5–12)
NEUTROPHILS NFR BLD AUTO: 5.18 10*3/MM3 (ref 1.7–7)
NEUTROPHILS NFR BLD AUTO: 67.2 % (ref 42.7–76)
NRBC BLD AUTO-RTO: 0 /100 WBC (ref 0–0.2)
PLATELET # BLD AUTO: 217 10*3/MM3 (ref 140–450)
PMV BLD AUTO: 10.3 FL (ref 6–12)
POTASSIUM SERPL-SCNC: 4.4 MMOL/L (ref 3.5–5.2)
PROT SERPL-MCNC: 7.5 G/DL (ref 6–8.5)
PSA SERPL-MCNC: 0.96 NG/ML (ref 0–4)
RBC # BLD AUTO: 4.71 10*6/MM3 (ref 4.14–5.8)
SODIUM SERPL-SCNC: 139 MMOL/L (ref 136–145)
TRIGL SERPL-MCNC: 109 MG/DL (ref 0–150)
TSH SERPL DL<=0.05 MIU/L-ACNC: 2.03 UIU/ML (ref 0.27–4.2)
VLDLC SERPL-MCNC: 20 MG/DL (ref 5–40)
WBC NRBC COR # BLD: 7.71 10*3/MM3 (ref 3.4–10.8)

## 2023-03-08 PROCEDURE — 86803 HEPATITIS C AB TEST: CPT

## 2023-03-08 PROCEDURE — 36415 COLL VENOUS BLD VENIPUNCTURE: CPT

## 2023-03-08 PROCEDURE — 99204 OFFICE O/P NEW MOD 45 MIN: CPT | Performed by: NURSE PRACTITIONER

## 2023-03-08 PROCEDURE — 80050 GENERAL HEALTH PANEL: CPT

## 2023-03-08 PROCEDURE — 90471 IMMUNIZATION ADMIN: CPT | Performed by: NURSE PRACTITIONER

## 2023-03-08 PROCEDURE — 90677 PCV20 VACCINE IM: CPT | Performed by: NURSE PRACTITIONER

## 2023-03-08 PROCEDURE — 80061 LIPID PANEL: CPT

## 2023-03-08 PROCEDURE — G0103 PSA SCREENING: HCPCS

## 2023-03-08 PROCEDURE — 82570 ASSAY OF URINE CREATININE: CPT

## 2023-03-08 PROCEDURE — 83036 HEMOGLOBIN GLYCOSYLATED A1C: CPT

## 2023-03-08 PROCEDURE — 82043 UR ALBUMIN QUANTITATIVE: CPT

## 2023-03-08 RX ORDER — DULAGLUTIDE 1.5 MG/.5ML
INJECTION, SOLUTION SUBCUTANEOUS
COMMUNITY
Start: 2023-02-24

## 2023-03-08 RX ORDER — BENAZEPRIL HYDROCHLORIDE 20 MG/1
20 TABLET ORAL DAILY
Qty: 90 TABLET | Refills: 3 | Status: SHIPPED | OUTPATIENT
Start: 2023-03-08

## 2023-03-08 RX ORDER — POLYETHYLENE GLYCOL-3350 AND ELECTROLYTES 236; 6.74; 5.86; 2.97; 22.74 G/274.31G; G/274.31G; G/274.31G; G/274.31G; G/274.31G
POWDER, FOR SOLUTION ORAL
COMMUNITY
Start: 2023-02-08 | End: 2023-03-08

## 2023-03-08 RX ORDER — METOPROLOL SUCCINATE 25 MG/1
25 TABLET, EXTENDED RELEASE ORAL DAILY
Qty: 90 TABLET | Refills: 3 | Status: SHIPPED | OUTPATIENT
Start: 2023-03-08

## 2023-03-08 NOTE — PROGRESS NOTES
Subjective   Hema Gapsar is a 67 y.o. male.     History of Present Illness  CC: Establish care/annual exam-hypertension, hyperlipidemia, diabetes, radiating neck pain  Hypertension  This is a chronic problem. The current episode started more than 1 year ago. The problem is controlled. Associated symptoms include neck pain (radiating down left shoulder/arm). Pertinent negatives include no blurred vision, chest pain, palpitations or shortness of breath. Risk factors for coronary artery disease include family history, dyslipidemia, diabetes mellitus, male gender and sedentary lifestyle. Current antihypertension treatment includes beta blockers and ACE inhibitors. The current treatment provides significant improvement. Compliance problems include exercise and diet.  There is no history of angina, kidney disease or CAD/MI.   Hyperlipidemia  This is a chronic problem. The current episode started more than 1 year ago. The problem is controlled. Recent lipid tests were reviewed and are variable. Exacerbating diseases include diabetes. Factors aggravating his hyperlipidemia include fatty foods and beta blockers. Pertinent negatives include no chest pain, focal sensory loss, focal weakness, leg pain, myalgias or shortness of breath. Current antihyperlipidemic treatment includes statins. The current treatment provides significant improvement of lipids. Compliance problems include adherence to exercise and adherence to diet.  Risk factors for coronary artery disease include family history, dyslipidemia, diabetes mellitus, hypertension, male sex and a sedentary lifestyle.   Diabetes  He presents for his follow-up diabetic visit. He has type 2 diabetes mellitus. His disease course has been stable. There are no hypoglycemic associated symptoms. Pertinent negatives for hypoglycemia include no dizziness. There are no diabetic associated symptoms. Pertinent negatives for diabetes include no blurred vision, no chest pain, no  fatigue, no polydipsia, no polyphagia, no polyuria, no weakness and no weight loss. There are no hypoglycemic complications. Symptoms are stable. There are no diabetic complications. Risk factors for coronary artery disease include family history, dyslipidemia, diabetes mellitus, male sex, hypertension and sedentary lifestyle. Current diabetic treatment includes oral agent (monotherapy) (trulicity). He is compliant with treatment most of the time. His weight is stable. He is following a diabetic diet. He rarely participates in exercise. His home blood glucose trend is fluctuating minimally. His overall blood glucose range is 140-180 mg/dl. An ACE inhibitor/angiotensin II receptor blocker is being taken.   Neck Pain   This is a new problem. The current episode started more than 1 month ago. The problem occurs intermittently. The problem has been waxing and waning. The pain is associated with nothing. The pain is present in the midline and left side. The quality of the pain is described as aching and shooting. The pain is moderate. Pertinent negatives include no chest pain, fever, leg pain, photophobia, trouble swallowing, weakness or weight loss. He has tried nothing for the symptoms. The treatment provided no relief.        The following portions of the patient's history were reviewed and updated as appropriate: allergies, current medications, past family history, past medical history, past social history, past surgical history and problem list.    Review of Systems   Constitutional: Negative for activity change, appetite change, chills, diaphoresis, fatigue, fever, unexpected weight gain and unexpected weight loss.   HENT: Negative for congestion, sore throat, trouble swallowing and voice change.    Eyes: Negative for blurred vision, double vision, photophobia, pain and visual disturbance.   Respiratory: Negative for cough, chest tightness, shortness of breath and wheezing.    Cardiovascular: Negative for chest  pain, palpitations and leg swelling.   Gastrointestinal: Negative for abdominal distention, abdominal pain, anal bleeding, blood in stool, constipation, diarrhea, nausea, vomiting, GERD and indigestion.   Endocrine: Negative for cold intolerance, heat intolerance, polydipsia, polyphagia and polyuria.   Genitourinary: Negative for dysuria, hematuria and urgency.   Musculoskeletal: Positive for neck pain (radiating down left shoulder/arm). Negative for back pain and myalgias.   Skin: Negative for rash.   Allergic/Immunologic: Negative.    Neurological: Negative for dizziness, focal weakness, syncope, weakness, light-headedness and headache.   Hematological: Negative.    Psychiatric/Behavioral: Negative for depressed mood.       Objective   Physical Exam  Vitals and nursing note reviewed.   Constitutional:       General: He is not in acute distress.     Appearance: Normal appearance. He is well-developed. He is obese. He is not ill-appearing, toxic-appearing or diaphoretic.   HENT:      Head: Normocephalic and atraumatic.      Right Ear: External ear normal.      Left Ear: External ear normal.      Nose: Nose normal.   Eyes:      Conjunctiva/sclera: Conjunctivae normal.      Pupils: Pupils are equal, round, and reactive to light.   Neck:      Thyroid: No thyromegaly.      Trachea: No tracheal deviation.   Cardiovascular:      Rate and Rhythm: Normal rate and regular rhythm.      Heart sounds: Normal heart sounds. No murmur heard.    No friction rub. No gallop.   Pulmonary:      Effort: Pulmonary effort is normal. No respiratory distress.      Breath sounds: Normal breath sounds. No stridor. No wheezing, rhonchi or rales.   Abdominal:      General: Bowel sounds are normal. There is no distension.      Palpations: Abdomen is soft. There is no mass.      Tenderness: There is no abdominal tenderness. There is no guarding or rebound.      Hernia: No hernia is present.   Musculoskeletal:         General: No tenderness.  Normal range of motion.      Cervical back: Normal range of motion and neck supple. Pain with movement, spinous process tenderness and muscular tenderness present. Normal range of motion.   Lymphadenopathy:      Cervical: No cervical adenopathy.   Skin:     General: Skin is warm and dry.      Coloration: Skin is not pale.      Findings: No erythema or rash.   Neurological:      Mental Status: He is alert and oriented to person, place, and time.      Cranial Nerves: No cranial nerve deficit.      Coordination: Coordination normal.   Psychiatric:         Mood and Affect: Mood normal.         Behavior: Behavior normal.         Thought Content: Thought content normal.         Judgment: Judgment normal.           Assessment & Plan   Diagnoses and all orders for this visit:    1. Encounter to establish care (Primary)    2. Annual physical exam  -     CBC & Differential; Future  -     Comprehensive Metabolic Panel; Future  -     Hemoglobin A1c; Future  -     Lipid Panel; Future  -     TSH; Future  -     PSA Screen; Future  -     Microalbumin / Creatinine Urine Ratio - Urine, Clean Catch; Future  -     Hepatitis C Antibody; Future, will call with results    3. Need for hepatitis C screening test  -     Hepatitis C Antibody; Future, will call with results    4. Prostate cancer screening  -     PSA Screen; Future, will call with results    5. Type 2 diabetes mellitus without complication, without long-term current use of insulin (HCC)  -     Comprehensive Metabolic Panel; Future  -     Hemoglobin A1c; Future  -     Lipid Panel; Future  -     Microalbumin / Creatinine Urine Ratio - Urine, Clean Catch; Future, will call with results.  Hemoglobin A1c over the last 4 years reviewed and average between 7 and 8 but typically not below 7.  Reinstructed in diabetic diet and eating habits.  Continue Trulicity and metformin as prescribed.  We will continue to monitor    6. Primary hypertension  -     CBC & Differential; Future  -      Comprehensive Metabolic Panel; Future  -     metoprolol succinate XL (TOPROL-XL) 25 MG 24 hr tablet; Take 1 tablet by mouth Daily.  Dispense: 90 tablet; Refill: 3  -     benazepril (LOTENSIN) 20 MG tablet; Take 1 tablet by mouth Daily.  Dispense: 90 tablet; Refill: 3   -Controlled.  We will call with lab results.  Continue Lotensin and Toprol-XL as prescribed.  We will continue to monitor.    7. Mixed hyperlipidemia  -     Comprehensive Metabolic Panel; Future  -     Hemoglobin A1c; Future  -     Lipid Panel; Future  -     TSH; Future, will call with results.  Continue low-fat diet and Crestor as prescribed.  We will continue to monitor.    8. Need for pneumococcal vaccination  -     Pneumococcal Conjugate Vaccine 20-Valent (PCV20), tolerated well with no adverse reaction    9. Pain radiating to neck  -     XR Spine Cervical Complete 4 or 5 View; Future, will call with results.  May consider referral to PT for further treatment pending x-ray results.    10.  Follow-up in 3 months or sooner for any acute needs.            This document has been electronically signed by BOWEN Vu on March 8, 2023 10:46 CST

## 2023-03-09 DIAGNOSIS — E78.2 MIXED HYPERLIPIDEMIA: Primary | ICD-10-CM

## 2023-03-09 LAB
ALBUMIN UR-MCNC: <1.2 MG/DL
CREAT UR-MCNC: 166.6 MG/DL
MICROALBUMIN/CREAT UR: NORMAL MG/G{CREAT}

## 2023-03-09 RX ORDER — ROSUVASTATIN CALCIUM 20 MG/1
20 TABLET, COATED ORAL NIGHTLY
Qty: 90 TABLET | Refills: 3 | Status: SHIPPED | OUTPATIENT
Start: 2023-03-09

## 2023-03-13 DIAGNOSIS — E11.9 TYPE 2 DIABETES MELLITUS WITHOUT COMPLICATION, WITHOUT LONG-TERM CURRENT USE OF INSULIN: Primary | ICD-10-CM

## 2023-03-13 DIAGNOSIS — I65.29 CAROTID ATHEROSCLEROSIS, UNSPECIFIED LATERALITY: Primary | ICD-10-CM

## 2023-03-16 ENCOUNTER — HOSPITAL ENCOUNTER (OUTPATIENT)
Dept: ULTRASOUND IMAGING | Facility: HOSPITAL | Age: 68
Discharge: HOME | End: 2023-03-16
Admitting: NURSE PRACTITIONER
Payer: COMMERCIAL

## 2023-03-16 DIAGNOSIS — I65.29 CAROTID ATHEROSCLEROSIS, UNSPECIFIED LATERALITY: ICD-10-CM

## 2023-03-16 PROCEDURE — 93880 EXTRACRANIAL BILAT STUDY: CPT

## 2023-03-17 DIAGNOSIS — M54.2 NECK PAIN ON LEFT SIDE: Primary | ICD-10-CM

## 2023-03-28 ENCOUNTER — HOSPITAL ENCOUNTER (OUTPATIENT)
Dept: PHYSICAL THERAPY | Facility: HOSPITAL | Age: 68
Setting detail: THERAPIES SERIES
Discharge: HOME | End: 2023-03-28
Payer: COMMERCIAL

## 2023-03-28 DIAGNOSIS — M54.2 NECK PAIN ON LEFT SIDE: Primary | ICD-10-CM

## 2023-03-28 PROCEDURE — 97161 PT EVAL LOW COMPLEX 20 MIN: CPT

## 2023-03-28 NOTE — THERAPY EVALUATION
Outpatient Physical Therapy Ortho Initial Evaluation  [facility]     Patient Name: Hema Gaspar  : 1955  MRN: [id number]  Today's Date: 3/28/2023      Visit Date: 2023   Attendance:  (90 approved)  Subjective Improvement: n/a  Next MD Visit: 23  Recert Date: 23    Therapy Diagnosis: neck pain      Patient Active Problem List   Diagnosis   • Abnormal EKG   • Syncope and collapse   • Precordial pain   • Bradycardia   • Hypotension   • Hypertension   • Mixed hyperlipidemia   • Diabetes mellitus (HCC)        Past Medical History:   Diagnosis Date   • Abnormal EKG    • Bradycardia    • Diabetes mellitus (HCC)    • Herpes zoster dermatitis of eyelids     OD, no globe involvement      • Hyperlipidemia    • Hypertension    • Hypotension    • Precordial pain    • Syncope and collapse         Past Surgical History:   Procedure Laterality Date   • COLONOSCOPY N/A 4/10/2018    Procedure: COLONOSCOPY;  Surgeon: Trev Day DO;  Location: [facility] ENDOSCOPY;  Service: Gastroenterology   • TONSILLECTOMY     • VASECTOMY       Current Outpatient Medications   Medication Instructions   • aspirin 81 mg, Oral, Daily   • benazepril (LOTENSIN) 20 mg, Oral, Daily   • Blood Glucose Monitoring Suppl kit Use as instructed to test blood sugar levels   • glucose blood test strip Other   • HM LANCETS MICRO THIN 33G misc No dose, route, or frequency recorded.   • magnesium oxide (MAGOX) 400 mg, Oral, Daily   • metFORMIN (GLUCOPHAGE) 1,000 mg, Oral, Daily With Breakfast   • metoprolol succinate XL (TOPROL-XL) 25 mg, Oral, Daily   • midodrine (PROAMATINE) 5 mg, Oral, 3 Times Daily   • Omega-3 Fatty Acids (FISH OIL PO) Oral, Daily   • rosuvastatin (CRESTOR) 20 mg, Oral, Nightly   • Trulicity 1.5 MG/0.5ML solution pen-injector INJECT 1.5 MG INTO THE SKIN ONCE A WEEK.     Allergies   Allergen Reactions   • Codeine Mental Status Change           Visit Dx:     ICD-10-CM ICD-9-CM   1. Neck pain on left  side  M54.2 723.1          Patient History     Row Name 03/28/23 1500             History    Chief Complaint Pain  -      Type of Pain Neck pain  bilat  -      Date Current Problem(s) Began --  Few months  -      Brief Description of Current Complaint Pt reports that he has been experiencing neck pain for a few months with no specific SANGEETA. 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 Both occupations pt believes could have contributed to his symptoms. No prior hx of neck injuries or surgeries.  male living with his spouse.  -      Patient/Caregiver Goals Relieve pain;Return to prior level of function  -      Current Tobacco Use No  -      Smoking Status Never  -      Patient's Rating of General Health Very good  -      Hand Dominance right-handed  -      Occupation/sports/leisure activities Occupation: self-employed (cuts trees down and operates heavy machinery). Hobbies: Welding  -      What clinical tests have you had for this problem? X-ray  -      Results of Clinical Tests Per imaging report of c-spine x-ray on 3/8/23: “1. Degenerative changes as above. No acute osseous abnormality. 2. Atherosclerotic carotid artery calcification”  -         Pain     Pain Location Neck  -      Pain at Present 0  -      Pain at Best 0  -      Pain at Worst 6  -      Pain Frequency Intermittent  -      Pain Description Tingling;Aching;Dull;Radiating  -      What Performance Factors Make the Current Problem(s) WORSE? looking up, looking over shoulders, looking down, and scap retraction.  -      What Performance Factors Make the Current Problem(s) BETTER? Laying down, sitting, resting, not moving head/neck  -      Pain Comments Occasional tingling in L little finger  -      Is your sleep disturbed? No  -      Is medication used to assist with sleep? No  -      Difficulties at work? Operating heavy machinery,  having to look over shoulders, up/down, etc.  -      Difficulties with ADL's? No issues  -      Difficulties with recreational activities? no issues  -         Fall Risk Assessment    Any falls in the past year: No  -            User Key  (r) = Recorded By, (t) = Taken By, (c) = Cosigned By    Initials Name Provider Type     Lorraine Thomas, PT Physical Therapist                 PT Ortho     Row Name 03/28/23 1500       Subjective Comments    Subjective Comments See therapy pt hx  -       Precautions and Contraindications    Contraindications Med hx: DM, HTN  -       Subjective Pain    Able to rate subjective pain? yes  -    Pre-Treatment Pain Level 0  -    Post-Treatment Pain Level 0  -       Posture/Observations    Posture/Observations Comments Posture: slight forward head. Increased muscle tension with TTP in upper traps and mid trap. None tender elsewhere in neck, scaps, and shoulders grossly.  -       Cervical/Thoracic Special Tests    Spurlings (Foraminal Compression) Positive  -    Cervical Compression (Forarminal Compression vs. Facet Pain) Negative  -    Cervical Distraction (Foraminal Compression vs. Facet Pain) Positive  -    Sharp-Irene (AA instability) Negative  -    Transverse Ligament (Instability) Negative  -       Upper Level Neural Tension Tests    Median Neural Tension Test Negative  -    Radial Neural Tension Test Negative  -    Ulnar Neural Tension Test Left:;Positive  -       General ROM    GENERAL ROM COMMENTS Bilat UE AROM WNL grossly and pain free  -       Head/Neck/Trunk    Neck Extension AROM 43 deg; pain free  -    Neck Flexion AROM 37 deg; painful catch  -    Neck Lt Lateral Flexion AROM 28 deg; mild pain  -    Neck Rt Lateral Flexion AROM 32 deg; mild pain  -    Neck Lt Rotation AROM 54 deg; mild pain  -    Neck Rt Rotation AROM 58 deg; pain free  -       MMT (Manual Muscle Testing)    Neck/Trunk Comments  -    Rt Upper Ext Comments   -    Lt Upper Ext Comments  -       MMT Neck/Trunk    Neck/Trunk Comments  Able to complete supine chin tuck with lift and hold for 10 sec without reports of fatigue  -       MMT Right Upper Ext    Rt Upper Extremity Comments  5/5 grossly  -       MMT Left Upper Ext    Lt Upper Extremity Comments  Shoulder 5/5 grossly. Periscap 4/5 grossly  -       Sensation    Sensation WNL? WNL  -    Light Touch No apparent deficits  -       Flexibility    Flexibility Tested? Upper Extremity  -       Upper Extremity Flexibility    Scalenes WNL  -MH    SCM WNL  -MH    Upper Trapezius Left:;Moderately limited  -    Levator Scapula Left:;Moderately limited  -       Transfers    Comment, (Transfers) Indpt with transfers  -       Gait/Stairs (Locomotion)    [place] Level (Gait) independent  -          User Key  (r) = Recorded By, (t) = Taken By, (c) = Cosigned By    Initials Name Provider Type    Lorraine Richardson, PT Physical Therapist                            Therapy Education  Education Details: HEP: UT S, LS S (left only)  Given: HEP  Program: New  How Provided: Verbal, Demonstration, Written  Provided to: Patient  Level of Understanding: Verbalized, Demonstrated      PT OP Goals     Row Name 03/28/23 1500          PT Short Term Goals    STG Date to Achieve 04/25/23  -     STG 1 Pt's mid trap, lower trap, rhomboid, and lat MMT will all improve to 5/5.  -     STG 1 Progress New  [facility]     STG 2 Pt will be independent with final HEP for self-management of symptoms.  -     STG 2 Progress New  [facility]     STG 3 Pt will reprot no increased pain with palpation to upper traps  -     STG 3 Progress New  [facility]     STG 4 Pt sergei lbe able to go thorugh cervical AROM without reports of increased pain.  -     STG 4 Progress New  [facility]     STG 5 Pt to have no limitations in upper trap or LS flexibility  -     STG 5 Progress New  [facility]        Time Calculation    PT Goal Re-Cert Due Date 04/18/23  -           User  Key  (r) = Recorded By, (t) = Taken By, (c) = Cosigned By    Initials Name Provider Type     Lorraine Thomas, PT Physical Therapist                 PT Assessment/Plan     Row Name 03/28/23 1500          PT Assessment    Functional Limitations Limitations in community activities;Performance in work activities  -     Impairments Impaired flexibility;Impaired muscle length;Impaired muscle endurance;Impaired muscle power;Joint mobility;Muscle strength;Pain;Poor body mechanics;Posture;Range of motion  -     Assessment Comments Mr. Gaspar is a 68 y/o male who presents to physical therapy with neck pain with no known SANGEETA. He reports currently experiencing pain/difficulty with looking up, looking over shoulders, looking down, and scap retraction. Upon evaluation, he presents with pain, TTP, increased muscle tension, decreased ROM, decreased flexibility, altered posture, and positive response to cervical distraction. Mr. Gaspar would benefit from skilled physical therapy to address the above deficits in order for him to return to his prior level of function and independent status.  -     Rehab Potential Good  -     Patient/caregiver participated in establishment of treatment plan and goals Yes  -     Patient would benefit from skilled therapy intervention Yes  -        PT Plan    PT Frequency 1x/week  -     Predicted Duration of Therapy Intervention (PT) 4 weeks  -     Planned CPT's? PT RE-EVAL: 92219;PT THER PROC EA 15 MIN: 25149;PT THER ACT EA 15 MIN: 40543;PT MANUAL THERAPY EA 15 MIN: 96070;PT NEUROMUSC RE-EDUCATION EA 15 MIN: 71656;PT SELF CARE/HOME MGMT/TRAIN EA 15: 00054;PT ELECTRICAL STIM UNATTEND: ;PT TRACTION CERVICAL: 55653;PT ULTRASOUND EA 15 MIN: 48303;PT HOT/COLD PACK WC NONMCARE: 89319;PT SELF CARE/MGMT/TRAIN 15 MIN: 82805;PT THER SUPP EA 15 MIN;PT EVAL LOW COMPLEXITY: 25521  -     PT Plan Comments ROM, strength, stretching, manual distraction (followed by mechanical if  improvements noted), posture, post chain strength, periscap strength. Other modalities and manual as needed.  -           User Key  (r) = Recorded By, (t) = Taken By, (c) = Cosigned By    Initials Name Provider Type    Lorraine Richardson, PAULA Physical Therapist                   OP Exercises     Row Name 03/28/23 1500             Subjective Comments    Subjective Comments See therapy pt hx  -MH         Subjective Pain    Able to rate subjective pain? yes  -      Pre-Treatment Pain Level 0  -MH      Post-Treatment Pain Level 0  -MH         Exercise 1    Exercise Name 1 L UT S  -MH      Sets 1 1  -MH      Time 1 30 sec hold  -MH         Exercise 2    Exercise Name 2 L LS S  -MH      Sets 2 1  -MH      Time 2 30 sec hold  -MH            User Key  (r) = Recorded By, (t) = Taken By, (c) = Cosigned By    Initials Name Provider Type    Lorraine Richardson, PAULA Physical Therapist                              Outcome Measure Options: Neck Disability Index (NDI)  Neck Disability Index  Section 1 - Pain Intensity: I have no pain at the moment.  Section 2 - Personal Care: I can look after myself normally, but it causes extra pain.  Section 3 - Lifting: I can lift heavy weights, but it causes extra pain.  Section 4 - Work: I can do most of my usual work, but no more  Section 5 - Headaches: I have no headaches at all.  Section 6 - Concentration: I can concentrate fully when I want to without difficulty.  Section 7 - Sleeping: I have no trouble sleeping.  Section 8 - Driving: I can drive my car without neck pain  Section 9 - Reading: I can read as much as I want with no neck pain.  Section 10 - Recreation: I am able to engage in most but not all recreational activities because of pain in my neck.  Neck Disability Index Score: 6      Time Calculation:     Start Time: 1515  Stop Time: 1600  Time Calculation (min): 45 min  Untimed Charges  PT Eval/Re-eval Minutes: 45  Total Minutes  Untimed Charges Total Minutes: 45   Total Minutes:  45     Therapy Charges for Today     Code Description Service Date Service Provider Modifiers Qty    [id number] HC PT EVAL LOW COMPLEXITY 3 3/28/2023 Lorraine Thomas, PT GP 1          PT G-Codes  Outcome Measure Options: Neck Disability Index (NDI)  Neck Disability Index Score: 6         Lorraine Thomas PT, DPT  3/28/2023

## 2023-04-04 ENCOUNTER — HOSPITAL ENCOUNTER (OUTPATIENT)
Dept: PHYSICAL THERAPY | Facility: HOSPITAL | Age: 68
Setting detail: THERAPIES SERIES
Discharge: HOME | End: 2023-04-04
Payer: COMMERCIAL

## 2023-04-04 DIAGNOSIS — M54.2 NECK PAIN ON LEFT SIDE: Primary | ICD-10-CM

## 2023-04-04 PROCEDURE — 97140 MANUAL THERAPY 1/> REGIONS: CPT

## 2023-04-04 PROCEDURE — 97110 THERAPEUTIC EXERCISES: CPT

## 2023-04-04 NOTE — THERAPY TREATMENT NOTE
Outpatient Physical Therapy Ortho Treatment Note  [facility]     Patient Name: Hema Gaspar  : 1955  MRN: [id number]  Today's Date: 2023      Visit Date: 2023  Subjective Improvement: yes  MD visit: 23  Visit Number: 2/2  Total Approved:90  Recert Date: 23  Visit Dx:    ICD-10-CM ICD-9-CM   1. Neck pain on left side  M54.2 723.1       Patient Active Problem List   Diagnosis   • Abnormal EKG   • Syncope and collapse   • Precordial pain   • Bradycardia   • Hypotension   • Hypertension   • Mixed hyperlipidemia   • Diabetes mellitus        Past Medical History:   Diagnosis Date   • Abnormal EKG    • Bradycardia    • Diabetes mellitus    • Herpes zoster dermatitis of eyelids     OD, no globe involvement      • Hyperlipidemia    • Hypertension    • Hypotension    • Precordial pain    • Syncope and collapse         Past Surgical History:   Procedure Laterality Date   • COLONOSCOPY N/A 4/10/2018    Procedure: COLONOSCOPY;  Surgeon: Trev Day DO;  Location: [facility] ENDOSCOPY;  Service: Gastroenterology   • TONSILLECTOMY     • VASECTOMY          PT Ortho     Row Name 23 1500       Subjective Comments    Subjective Comments pt reports neck feels worse with the ex. PTA went over the stretches again with pt. Pt had been pulling on the neck. Instructed pt not to pull on the next but rest hand on head.  -TL       Precautions and Contraindications    Contraindications Med hx: DM, HTN  -TL       Subjective Pain    Able to rate subjective pain? yes  -TL    Pre-Treatment Pain Level 5  -TL    Post-Treatment Pain Level 0  -TL          User Key  (r) = Recorded By, (t) = Taken By, (c) = Cosigned By    Initials Name Provider Type    Maria Antonia Gilbert PTA Physical Therapist Assistant                             PT Assessment/Plan     Row Name 23 1600          PT Assessment    Assessment Comments Pt coming into clinic had tingling with left ue. Pt reported that he thought  that his neck felt worse after stretches. PTA instructed pt not to pull into pain with stretches. Pt tolerated ex this date and stretches. Pt responded well to manual. Pt left without tingling in the left UE or pain in neck.  -TL        PT Plan    PT Frequency 1x/week  -TL     Predicted Duration of Therapy Intervention (PT) 4 weeks  -TL     PT Plan Comments continue with manual  -TL           User Key  (r) = Recorded By, (t) = Taken By, (c) = Cosigned By    Initials Name Provider Type    Maria Antonia Gilbert PTA Physical Therapist Assistant                 Modalities     Row Name 04/04/23 1500             Moist Heat    MH Applied Yes  -TL      Location c-spine  -TL      MH S/P Rx Yes  -TL            User Key  (r) = Recorded By, (t) = Taken By, (c) = Cosigned By    Initials Name Provider Type    Maria Antonia Gilbert PTA Physical Therapist Assistant               OP Exercises     Row Name 04/04/23 1500             Subjective Comments    Subjective Comments pt reports neck feels worse with the ex. PTA went over the stretches again with pt. Pt had been pulling on the neck. Instructed pt not to pull on the next but rest hand on head.  -TL         Subjective Pain    Able to rate subjective pain? yes  -TL      Pre-Treatment Pain Level 5  -TL      Post-Treatment Pain Level 0  -TL         Total Minutes    80636 - PT Manual Therapy Minutes 15  -TL         Exercise 1    Exercise Name 1 moist heat  -TL      Time 1 10 min  -TL         Exercise 2    Exercise Name 2 L LS S  -TL      Sets 2 2  -TL      Time 2 30 sec hold  -TL      Additional Comments both  -TL         Exercise 3    Exercise Name 3 upper trap S  -TL      Sets 3 2  -TL      Time 3 30 sec hold  -TL      Additional Comments both  -TL         Exercise 4    Exercise Name 4 arom c-spine 6 way  -TL      Sets 4 1  -TL      Reps 4 10  -TL      Time 4 3 sec hold  -TL         Exercise 5    Exercise Name 5 chin tucks  -TL      Sets 5 1  -TL      Reps 5 10  -TL      Time 5 5 sec  hold  -TL         Exercise 6    Exercise Name 6 see manual  -TL            User Key  (r) = Recorded By, (t) = Taken By, (c) = Cosigned By    Initials Name Provider Type    Maria Antonia Gilbert PTA Physical Therapist Assistant                         Manual Rx (last 36 hours)     Manual Treatments     Row Name 04/04/23 1500             Total Minutes    12144 - PT Manual Therapy Minutes 15  -TL         Manual Rx 1    Manual Rx 1 Location tissue lengthening arom c-spine  -TL         Manual Rx 2    Manual Rx 2 Location upper trap  -TL      Manual Rx 2 Grade pincer/opposing thumb  -TL         Manual Rx 3    Manual Rx 3 Location levator  -TL      Manual Rx 3 Grade pressure  -TL         Manual Rx 4    Manual Rx 4 Location SCM  -TL      Manual Rx 4 Grade opposing thumbs/pincer  -TL         Manual Rx 5    Manual Rx 5 Location manual distraction c-spine  -TL            User Key  (r) = Recorded By, (t) = Taken By, (c) = Cosigned By    Initials Name Provider Type    Maria Antonia Gilbert PTA Physical Therapist Assistant                 PT OP Goals     Row Name 04/04/23 1600 04/04/23 1500       PT Short Term Goals    STG Date to Achieve 04/25/23  -TL 04/25/23  -TL    STG 1 Pt's mid trap, lower trap, rhomboid, and lat MMT will all improve to 5/5.  -TL Pt's mid trap, lower trap, rhomboid, and lat MMT will all improve to 5/5.  -TL    STG 1 Progress Ongoing  -TL New  -TL    STG 2 Pt will be independent with final HEP for self-management of symptoms.  -TL Pt will be independent with final HEP for self-management of symptoms.  -TL    STG 2 Progress Ongoing  -TL New  -TL    STG 3 Pt will reprot no increased pain with palpation to upper traps  -TL Pt will reprot no increased pain with palpation to upper traps  -TL    STG 3 Progress Ongoing  -TL New  -TL    STG 4 Pt sergei lbe able to go thorugh cervical AROM without reprots of increased pain.  -TL Pt sergei lbe able to go thorugh cervical AROM without reprots of increased pain.  -TL    STG 4  Progress Ongoing  -TL New  -TL    STG 5 Pt to have no limitations in upper trap or LS flexibility  -TL Pt to have no limitations in upper trap or LS flexibility  -TL    STG 5 Progress Ongoing  -TL New  -TL       Time Calculation    PT Goal Re-Cert Due Date 04/18/23  -TL 04/18/23  -TL          User Key  (r) = Recorded By, (t) = Taken By, (c) = Cosigned By    Initials Name Provider Type    Maria Antonia Gilbert, ANNA Physical Therapist Assistant                Therapy Education  Education Details: arom c-spine, chin tuck  Given: HEP  Program: New, Reinforced  How Provided: Verbal, Demonstration, Written  Provided to: Patient  Level of Understanding: Teach back education performed, Verbalized, Demonstrated              Time Calculation:   Start Time: 1516  Stop Time: 1601  Time Calculation (min): 45 min  PT Non-Billable Time (min): 10 min  Timed Charges  20003 - PT Manual Therapy Minutes: 15  Total Minutes  Timed Charges Total Minutes: 15   Total Minutes: 15  Therapy Charges for Today     Code Description Service Date Service Provider Modifiers Qty    66171034951 HC PT MANUAL THERAPY EA 15 MIN 4/4/2023 Maria Antonia Ledezma PTA GP, CQ 1    87722599435 HC PT THER PROC EA 15 MIN 4/4/2023 Maria Antonia Ledezma PTA GP, CQ 1                    Maria Antonia Ledezma PTA  4/4/2023

## 2023-04-05 ENCOUNTER — OFFICE VISIT (OUTPATIENT)
Dept: ENDOCRINOLOGY | Facility: CLINIC | Age: 68
End: 2023-04-05
Payer: COMMERCIAL

## 2023-04-05 DIAGNOSIS — E11.9 TYPE 2 DIABETES MELLITUS WITHOUT COMPLICATION, WITHOUT LONG-TERM CURRENT USE OF INSULIN: ICD-10-CM

## 2023-04-05 PROCEDURE — G0108 DIAB MANAGE TRN  PER INDIV: HCPCS | Performed by: DIETITIAN, REGISTERED

## 2023-04-05 NOTE — PROGRESS NOTES
Hema Gaspar is a 67 y.o. male referred for outpatient diabetes education by Sean WISEMAN. Patient attended individual education for 30 minutes on 04/05/2023. Topics covered included:     1. Healthy Food Choices   i. Provided patient with detailed carbohydrate counting guide.    ii. Instructed patient to eat 45-60 grams of carbohydrate with each meal and 15 grams of carb (plus protein source) for snacks    iii. Provided patient with list of non-starchy vegetables and foods that are low in carbohydrate for snacks and to incorporate with meals (Planning Healthy Meals Packet).    iv. Choose fruits, vegetables, whole grains, legumes, low-fat milk, fiber-rich foods, minimal saturated fats, and watch cholesterol and sodium intake.    v. Reviewed carbohydrate-containing foods, standard serving sizes, and measuring foods.   vi. Reviewed the difference between simple and complex carbohydrate. Encouraged patient to choose complex carbohydrates more often.   vii. Reviewed label reading. Discussed looking at serving size, total carbohydrates, fiber, saturated fat, sodium. Reviewed  amounts of each to have per day/meal.      2. Pathophysiology of Diabetes   i. Explained common conditions associated with uncontrolled diabetes (diabetic neuropathy, retinopathy, nephropathy, heart disease, skin infections, and kidney disease)     3. Hyperglycemia/Hypoglycemia   i. Discussed signs, symptoms, and difference between hypo/hyperglycemia - and the proper treatment guidelines for both.    ii. Explained A1C and the average blood sugar that goes along with the A1C level.    iii. Encouraged pt to continue to get >30 minutes/day PA for better glycemic control     Provided patient with Diabetes and You book, and Planning Healthy Meals book. Provided handout of sample menu with carbs listed.      Thank you Sean WISEMAN for this referral.      DANYELL Georges, RD, LD

## 2023-04-12 ENCOUNTER — HOSPITAL ENCOUNTER (OUTPATIENT)
Dept: PHYSICAL THERAPY | Facility: HOSPITAL | Age: 68
Setting detail: THERAPIES SERIES
Discharge: HOME | End: 2023-04-12
Payer: COMMERCIAL

## 2023-04-12 DIAGNOSIS — M54.2 NECK PAIN ON LEFT SIDE: Primary | ICD-10-CM

## 2023-04-12 PROCEDURE — 97140 MANUAL THERAPY 1/> REGIONS: CPT

## 2023-04-12 PROCEDURE — 97110 THERAPEUTIC EXERCISES: CPT

## 2023-04-12 NOTE — THERAPY TREATMENT NOTE
Outpatient Physical Therapy Ortho Treatment Note  [facility]     Patient Name: Hema Gaspar  : 1955  MRN: [id number]  Today's Date: 2023      Visit Date: 2023   Attendance: 3/3 (90 approved)  Subjective Improvement: some  Next MD Visit: 23  Recert Date: 23     Therapy Diagnosis: neck pain    Visit Dx:    ICD-10-CM ICD-9-CM   1. Neck pain on left side  M54.2 723.1       Patient Active Problem List   Diagnosis   • Abnormal EKG   • Syncope and collapse   • Precordial pain   • Bradycardia   • Hypotension   • Hypertension   • Mixed hyperlipidemia   • Diabetes mellitus        Past Medical History:   Diagnosis Date   • Abnormal EKG    • Bradycardia    • Diabetes mellitus    • Herpes zoster dermatitis of eyelids     OD, no globe involvement      • Hyperlipidemia    • Hypertension    • Hypotension    • Precordial pain    • Syncope and collapse         Past Surgical History:   Procedure Laterality Date   • COLONOSCOPY N/A 4/10/2018    Procedure: COLONOSCOPY;  Surgeon: Trev Day DO;  Location: [facility] ENDOSCOPY;  Service: Gastroenterology   • TONSILLECTOMY     • VASECTOMY          PT Ortho     Row Name 23 1300       Subjective Comments    Subjective Comments Pt stated that his neck is already doing better. He felt much better following his last appt.  -       Precautions and Contraindications    Contraindications Med hx: DM, HTN  -       Subjective Pain    Able to rate subjective pain? yes  -    Pre-Treatment Pain Level 0  -    Post-Treatment Pain Level 0  -       Posture/Observations    Posture/Observations Comments Increased muscle tension in bilat UT and mid trap (L>R). L shoulder elevation  -          User Key  (r) = Recorded By, (t) = Taken By, (c) = Cosigned By    Initials Name Provider Type    Lorraine Richardson, PT Physical Therapist                             PT Assessment/Plan     Row Name 23 1300          PT Assessment    Functional  Limitations Limitations in community activities;Performance in work activities  -     Impairments Impaired flexibility;Impaired muscle length;Impaired muscle endurance;Impaired muscle power;Joint mobility;Muscle strength;Pain;Poor body mechanics;Posture;Range of motion  -     Assessment Comments Initiated therapy with P to aid in tissue relaxation followed by manual. Improved tissue pliability and posture following manual. Pt continues to do well. Improved ROM without reports of pain this date. Added scap strength exercises which pt did very well with. HEP updated to include two new exercises.  -     Rehab Potential Good  -     Patient/caregiver participated in establishment of treatment plan and goals Yes  -     Patient would benefit from skilled therapy intervention Yes  -        PT Plan    PT Frequency 1x/week  -     Predicted Duration of Therapy Intervention (PT) 4 weeks  -     PT Plan Comments Add tband to scap retraction and no money. Add lat pull down with tband. Recheck next visit  -           User Key  (r) = Recorded By, (t) = Taken By, (c) = Cosigned By    Initials Name Provider Type     Lorraine Thomas, PT Physical Therapist                   OP Exercises     Row Name 04/12/23 1300             Subjective Comments    Subjective Comments Pt stated that his neck is already doing better. He felt much better following his last appt.  -         Subjective Pain    Able to rate subjective pain? yes  -      Pre-Treatment Pain Level 0  -      Post-Treatment Pain Level 0  -         Total Minutes    92101 - PT Therapeutic Exercise Minutes 23  -      32044 - PT Manual Therapy Minutes 15  -MH         Exercise 1    Exercise Name 1 MHP  -      Time 1 8 min  -         Exercise 2    Exercise Name 2 See manual  -         Exercise 3    Exercise Name 3 arom c-spine 6 way with self over pressure  -      Sets 3 1  -      Reps 3 10  -      Time 3 3 sec hold  -         Exercise 4     Exercise Name 4 scap retraction  -      Sets 4 1  -      Reps 4 20  -MH      Time 4 5 sec hold  -         Exercise 5    Exercise Name 5 No money  -      Sets 5 1  -      Reps 5 20  -      Time 5 5 sec hold  -            User Key  (r) = Recorded By, (t) = Taken By, (c) = Cosigned By    Initials Name Provider Type     Lorraine Thomas, PT Physical Therapist                         Manual Rx (last 36 hours)     Manual Treatments     Row Name 04/12/23 1300             Total Minutes    73056 - PT Manual Therapy Minutes 15  -         Manual Rx 1    Manual Rx 1 Location UT, LS, mid trap  -      Manual Rx 1 Type STM, MFR, TPR  -      Manual Rx 1 Duration 10 min (pt in prone)  -         Manual Rx 2    Manual Rx 2 Location L shoulder  -      Manual Rx 2 Type inferior mobs  -      Manual Rx 2 Duration 5 min  -            User Key  (r) = Recorded By, (t) = Taken By, (c) = Cosigned By    Initials Name Provider Type     Lorraine Thomas, PT Physical Therapist                 PT OP Goals     Row Name 04/12/23 1300          PT Short Term Goals    STG Date to Achieve 04/25/23  -     STG 1 Pt's mid trap, lower trap, rhomboid, and lat MMT will all improve to 5/5.  -     STG 1 Progress Ongoing  -     STG 2 Pt will be independent with final HEP for self-management of symptoms.  -     STG 2 Progress Ongoing  -     STG 3 Pt will reprot no increased pain with palpation to upper traps  -     STG 3 Progress Ongoing  -     STG 4 Pt sergei be able to go through cervical AROM without reports of increased pain.  -     STG 4 Progress Ongoing  -     STG 5 Pt to have no limitations in upper trap or LS flexibility  -     STG 5 Progress Ongoing  -        Time Calculation    PT Goal Re-Cert Due Date 04/18/23  -           User Key  (r) = Recorded By, (t) = Taken By, (c) = Cosigned By    Initials Name Provider Type    Lorraine Richardson, PAULA Physical Therapist                Therapy Education  Education  Details: scap retraction, no money  Given: HEP  Program: New  How Provided: Verbal, Demonstration, Written  Provided to: Patient  Level of Understanding: Verbalized, Demonstrated              Time Calculation:   Start Time: 1345  Stop Time: 1431  Time Calculation (min): 46 min  PT Non-Billable Time (min): 8 min  Timed Charges  40355 - PT Therapeutic Exercise Minutes: 23  86556 - PT Manual Therapy Minutes: 15  Total Minutes  Timed Charges Total Minutes: 38   Total Minutes: 38  Therapy Charges for Today     Code Description Service Date Service Provider Modifiers Qty    03804104805 HC PT THER PROC EA 15 MIN 4/12/2023 Lorraine Thomas, PT GP 2    87471264246 HC PT MANUAL THERAPY EA 15 MIN 4/12/2023 Lorraine Thomas, PT GP 1                    Lorraine Thomas PT, DPT  4/12/2023

## 2023-04-19 ENCOUNTER — HOSPITAL ENCOUNTER (OUTPATIENT)
Dept: PHYSICAL THERAPY | Facility: HOSPITAL | Age: 68
Setting detail: THERAPIES SERIES
Discharge: HOME | End: 2023-04-19
Payer: COMMERCIAL

## 2023-04-19 DIAGNOSIS — M54.2 NECK PAIN ON LEFT SIDE: Primary | ICD-10-CM

## 2023-04-19 PROCEDURE — 97110 THERAPEUTIC EXERCISES: CPT

## 2023-04-19 PROCEDURE — 97140 MANUAL THERAPY 1/> REGIONS: CPT

## 2023-04-19 NOTE — THERAPY DISCHARGE NOTE
"     Outpatient Physical Therapy Ortho Progress Note/Discharge Summary  [facility]     Patient Name: Hema Gaspar  : 1955  MRN: [id number]  Today's Date: 2023      Visit Date: 2023  Attendance:  (90 approved)  Subjective Improvement: 95%  Next MD Visit: 23  Recert Date: d/c     Therapy Diagnosis: neck pain    Visit Dx:    ICD-10-CM ICD-9-CM   1. Neck pain on left side  M54.2 723.1       Patient Active Problem List   Diagnosis   • Abnormal EKG   • Syncope and collapse   • Precordial pain   • Bradycardia   • Hypotension   • Hypertension   • Mixed hyperlipidemia   • Diabetes mellitus        Past Medical History:   Diagnosis Date   • Abnormal EKG    • Bradycardia    • Diabetes mellitus    • Herpes zoster dermatitis of eyelids     OD, no globe involvement      • Hyperlipidemia    • Hypertension    • Hypotension    • Precordial pain    • Syncope and collapse         Past Surgical History:   Procedure Laterality Date   • COLONOSCOPY N/A 4/10/2018    Procedure: COLONOSCOPY;  Surgeon: Trev Day DO;  Location: [facility] ENDOSCOPY;  Service: Gastroenterology   • TONSILLECTOMY     • VASECTOMY          PT Ortho     Row Name 23 1300       Subjective Comments    Subjective Comments Pt reports that his neck is doing \"sooo much better\". 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 He stated the neck was more irritated but not painful. 95% subjective improvement.  -       Precautions and Contraindications    Contraindications Med hx: DM, HTN  -       Subjective Pain    Able to rate subjective pain? yes  -    Pre-Treatment Pain Level 0  -    Post-Treatment Pain Level 0  -    Subjective Pain Comment \"it's more irritated\"  -       Posture/Observations    Posture/Observations Comments No TTP grossly. Mild increased muscle tension in upper traps that resolved with manual  -       General ROM    GENERAL ROM COMMENTS Bilat UE AROM " WNL grossly and pain free  -MH       Head/Neck/Trunk    Neck Extension AROM 45 deg  -MH    Neck Flexion AROM WNL; pain free  -MH    Neck Lt Lateral Flexion AROM 40 deg  -MH    Neck Rt Lateral Flexion AROM 51 deg  -MH    Neck Lt Rotation AROM 70 deg  -MH    Neck Rt Rotation AROM 74 deg  -MH    Head/Neck/Trunk Comments pain free AROM grossly  -MH       MMT Neck/Trunk    Neck/Trunk Comments  WNL  -MH       MMT Right Upper Ext    Rt Upper Extremity Comments  5/5 grossly  -MH          User Key  (r) = Recorded By, (t) = Taken By, (c) = Cosigned By    Initials Name Provider Type     Lorraine Thomas, PT Physical Therapist                             PT Assessment/Plan     Row Name 04/19/23 1300          PT Assessment    Functional Limitations --  -MH     Impairments Poor body mechanics;Posture  -     Assessment Comments Recheck completed this visit. Pt has met all goals to date. He has demonstrated improvements in cervical ROM (all WFL), periscap strength, cervical strength, posture, and flexibility. He demonstrated all objective measures WFL. No pain, no TTP. Slight increased muscle tension in upper traps only which resolved with manual. At this time, pt is d/c from skilled PT to home with HEP. Time was spent updating pt’s HEP and pt was distributed a green tband for home use.  -     Rehab Potential Good  -MH     Patient/caregiver participated in establishment of treatment plan and goals Yes  -MH     Patient would benefit from skilled therapy intervention --  -        PT Plan    Predicted Duration of Therapy Intervention (PT) d/c  -     PT Plan Comments d/c home with HEP  -           User Key  (r) = Recorded By, (t) = Taken By, (c) = Cosigned By    Initials Name Provider Type    Lorraine Richardson, PT Physical Therapist                 Modalities     Row Name 04/19/23 1300             Moist Heat    MH Applied Yes  -MH      Location c-spine  -MH      MH Prior to Rx Yes  -MH            User Key  (r) = Recorded By,  "(t) = Taken By, (c) = Cosigned By    Initials Name Provider Type     Lorraine Thomas, PT Physical Therapist                 OP Exercises     Row Name 04/19/23 1300             Subjective Comments    Subjective Comments Pt reports that his neck is doing \"sooo much better\". 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 He stated the neck was more irritated but not painful. 95% subjective improvement.  -         Subjective Pain    Able to rate subjective pain? yes  -      Pre-Treatment Pain Level 0  -      Post-Treatment Pain Level 0  -      Subjective Pain Comment \"it's more irritated\"  -         Total Minutes    27936 - PT Therapeutic Exercise Minutes 17  -      02331 - PT Manual Therapy Minutes 15  -         Exercise 1    Exercise Name 1 MHP  -      Time 1 8 min  -         Exercise 2    Exercise Name 2 See manual  -         Exercise 3    Exercise Name 3 Recheck  -         Exercise 4    Exercise Name 4 tband Scap retraction  -      Sets 4 1  -      Reps 4 10  -MH      Additional Comments GTB  -         Exercise 5    Exercise Name 5 tband no money  -      Sets 5 1  -      Reps 5 10  -MH      Additional Comments GTB  -         Exercise 6    Exercise Name 6 HEP review  -            User Key  (r) = Recorded By, (t) = Taken By, (c) = Cosigned By    Initials Name Provider Type     Lorraine Thomas, PT Physical Therapist                           Manual Rx (last 36 hours)     Manual Treatments     Row Name 04/19/23 1300             Total Minutes    76012 - PT Manual Therapy Minutes 15  -MH         Manual Rx 1    Manual Rx 1 Location UT, LS, mid trap  -      Manual Rx 1 Type STM, MFR, TPR  -         Manual Rx 2    Manual Rx 2 Location c-spine  -      Manual Rx 2 Type Distraction  -            User Key  (r) = Recorded By, (t) = Taken By, (c) = Cosigned By    Initials Name Provider Type     Lorraine Thomas, PT Physical Therapist          "        PT OP Goals     Row Name 04/19/23 1300          PT Short Term Goals    STG Date to Achieve 04/25/23  -     STG 1 Pt's mid trap, lower trap, rhomboid, and lat MMT will all improve to 5/5.  -     STG 1 Progress Met   -     STG 2 Pt will be independent with final HEP for self-management of symptoms.  -     STG 2 Progress Met   -     STG 3 Pt will reprot no increased pain with palpation to upper traps  -     STG 3 Progress Met   -     STG 4 Pt will be able to go through cervical AROM without reports of increased pain.  -     STG 4 Progress Met   -     STG 5 Pt to have no limitations in upper trap or LS flexibility  -     STG 5 Progress Met   -           User Key  (r) = Recorded By, (t) = Taken By, (c) = Cosigned By    Initials Name Provider Type    Lorraine Richardson, PAULA Physical Therapist                Therapy Education  Education Details: Tband no money and scap retraction  Given: HEP  Program: Progressed  How Provided: Verbal, Demonstration  Provided to: Patient  Level of Understanding: Verbalized, Teach back education performed, Demonstrated    Outcome Measure Options: Neck Disability Index (NDI)  Neck Disability Index  Section 1 - Pain Intensity: I have no pain at the moment.  Section 2 - Personal Care: I can look after myself normally without causing extra pain.  Section 3 - Lifting: I can lift heavy weights without extra pain  Section 4 - Work: I can do as much work as I want.  Section 5 - Headaches: I have no headaches at all.  Section 6 - Concentration: I can concentrate fully when I want to without difficulty.  Section 7 - Sleeping: I have no trouble sleeping.  Section 8 - Driving: I can drive my car without neck pain  Section 9 - Reading: I can read as much as I want with no neck pain.  Section 10 - Recreation: I am able to engage in all recreational activities with no pain in my neck at all.  Neck Disability Index Score: 0      Time Calculation:   Start Time: 1348  Stop Time:  1428  Time Calculation (min): 40 min  PT Non-Billable Time (min): 8 min  Timed Charges  78103 - PT Therapeutic Exercise Minutes: 17  94290 - PT Manual Therapy Minutes: 15  Total Minutes  Timed Charges Total Minutes: 32   Total Minutes: 32  Therapy Charges for Today     Code Description Service Date Service Provider Modifiers Qty    41910592054 HC PT THER PROC EA 15 MIN 4/19/2023 Lorraine Thomas, PT GP 1    36075062782 HC PT MANUAL THERAPY EA 15 MIN 4/19/2023 Lorraine Thomas, PT GP 1    04612225638 HC PT THER SUPP EA 15 MIN 4/19/2023 Lorraine Thomas, PT GP 1          PT G-Codes  Outcome Measure Options: Neck Disability Index (NDI)  Neck Disability Index Score: 0     OP PT Discharge Summary  Date of Discharge: 04/19/23  Reason for Discharge: All goals achieved, Maximum functional potential achieved  Outcomes Achieved: Able to achieve all goals within established timeline  Discharge Destination: Home with home program      Lorraine Thomas PT, DPT  4/19/2023

## 2023-04-20 ENCOUNTER — OFFICE VISIT (OUTPATIENT)
Dept: FAMILY MEDICINE CLINIC | Facility: CLINIC | Age: 68
End: 2023-04-20
Payer: COMMERCIAL

## 2023-04-20 VITALS
HEART RATE: 91 BPM | HEIGHT: 72 IN | WEIGHT: 200.4 LBS | SYSTOLIC BLOOD PRESSURE: 120 MMHG | BODY MASS INDEX: 27.14 KG/M2 | TEMPERATURE: 97.5 F | DIASTOLIC BLOOD PRESSURE: 60 MMHG | RESPIRATION RATE: 18 BRPM | OXYGEN SATURATION: 96 %

## 2023-04-20 DIAGNOSIS — J01.40 ACUTE NON-RECURRENT PANSINUSITIS: Primary | ICD-10-CM

## 2023-04-20 DIAGNOSIS — M53.3 COCCYX PAIN: ICD-10-CM

## 2023-04-20 PROBLEM — K57.30 DIVERTICULOSIS OF COLON WITHOUT DIVERTICULITIS: Status: ACTIVE | Noted: 2023-04-20

## 2023-04-20 LAB
EXPIRATION DATE: NORMAL
FLUAV AG UPPER RESP QL IA.RAPID: NOT DETECTED
FLUBV AG UPPER RESP QL IA.RAPID: NOT DETECTED
INTERNAL CONTROL: NORMAL
Lab: NORMAL
SARS-COV-2 AG UPPER RESP QL IA.RAPID: NOT DETECTED

## 2023-04-20 PROCEDURE — 87428 SARSCOV & INF VIR A&B AG IA: CPT | Performed by: NURSE PRACTITIONER

## 2023-04-20 PROCEDURE — 99213 OFFICE O/P EST LOW 20 MIN: CPT | Performed by: NURSE PRACTITIONER

## 2023-04-20 RX ORDER — CEFUROXIME AXETIL 500 MG/1
500 TABLET ORAL 2 TIMES DAILY
Qty: 20 TABLET | Refills: 0 | Status: SHIPPED | OUTPATIENT
Start: 2023-04-20 | End: 2023-04-30

## 2023-04-20 NOTE — PROGRESS NOTES
"Subjective   Hema Gaspar is a 67 y.o. male.     History of Present Illness  CC: URI symptoms, tailbone  URI   This is a new problem. The current episode started in the past 7 days. The problem has been gradually worsening. There has been no fever. Associated symptoms include congestion, coughing, headaches, rhinorrhea and sinus pain. Pertinent negatives include no abdominal pain, chest pain, diarrhea, dysuria, nausea, rash, sore throat, vomiting or wheezing. He has tried antihistamine for the symptoms. The treatment provided no relief.   Back Pain  This is a chronic problem. The current episode started more than 1 year ago. The problem occurs intermittently. The problem has been gradually worsening since onset. The pain is present in the sacro-iliac. The quality of the pain is described as aching. The pain does not radiate. The pain is mild. The symptoms are aggravated by sitting. Associated symptoms include headaches. Pertinent negatives include no abdominal pain, bladder incontinence, bowel incontinence, chest pain, dysuria, fever, leg pain, numbness, paresis, paresthesias, pelvic pain, perianal numbness, tingling, weakness or weight loss. Risk factors: Chronic tailbone pain.  Pain is worsened with weight loss.  Patient reports always having a \"bony butt\".  No dysuria or radiating leg pain. Treatments tried: Cushioned chairs. The treatment provided moderate relief.        The following portions of the patient's history were reviewed and updated as appropriate: allergies, current medications, past family history, past medical history, past social history, past surgical history and problem list.    Review of Systems   Constitutional: Negative for activity change, appetite change, fatigue, fever, unexpected weight gain and unexpected weight loss.   HENT: Positive for congestion, rhinorrhea and sinus pressure. Negative for sore throat, trouble swallowing and voice change.    Eyes: Negative.    Respiratory: " Positive for cough. Negative for chest tightness, shortness of breath and wheezing.    Cardiovascular: Negative for chest pain, palpitations and leg swelling.   Gastrointestinal: Negative for abdominal pain, bowel incontinence, diarrhea, nausea and vomiting.   Endocrine: Negative.    Genitourinary: Negative for dysuria, hematuria, pelvic pain, urgency and urinary incontinence.   Musculoskeletal: Positive for back pain. Negative for arthralgias and myalgias.   Skin: Negative for rash.   Neurological: Negative for dizziness, tingling, weakness, light-headedness, numbness, headache and paresthesias.   Hematological: Negative.    Psychiatric/Behavioral: Negative.        Objective   Physical Exam  Vitals and nursing note reviewed.   Constitutional:       General: He is not in acute distress.     Appearance: Normal appearance. He is well-developed. He is obese. He is not ill-appearing, toxic-appearing or diaphoretic.   HENT:      Head: Normocephalic and atraumatic.      Nose: Mucosal edema, congestion and rhinorrhea present. Rhinorrhea is purulent.      Right Sinus: Maxillary sinus tenderness and frontal sinus tenderness present.      Left Sinus: Maxillary sinus tenderness and frontal sinus tenderness present.      Mouth/Throat:      Pharynx: Uvula midline. Posterior oropharyngeal erythema present. No pharyngeal swelling, oropharyngeal exudate or uvula swelling.      Tonsils: No tonsillar exudate or tonsillar abscesses.   Eyes:      Conjunctiva/sclera: Conjunctivae normal.   Cardiovascular:      Rate and Rhythm: Normal rate and regular rhythm.      Heart sounds: Normal heart sounds. No murmur heard.    No friction rub. No gallop.   Pulmonary:      Effort: Pulmonary effort is normal. No respiratory distress.      Breath sounds: Normal breath sounds. No wheezing or rales.   Abdominal:      General: Bowel sounds are normal. There is no distension.      Palpations: Abdomen is soft. There is no mass.      Tenderness: There is  no abdominal tenderness. There is no guarding or rebound.      Hernia: No hernia is present.   Musculoskeletal:         General: No tenderness. Normal range of motion.      Cervical back: Normal range of motion.   Skin:     General: Skin is warm and dry.      Coloration: Skin is not pale.      Findings: No erythema or rash.   Neurological:      Mental Status: He is alert and oriented to person, place, and time.   Psychiatric:         Behavior: Behavior normal.         Thought Content: Thought content normal.         Judgment: Judgment normal.           Assessment & Plan   Diagnoses and all orders for this visit:    1. Acute non-recurrent pansinusitis (Primary)  -     POCT SARS-CoV-2 Antigen CHRIS + Flu  -     cefuroxime (CEFTIN) 500 MG tablet; Take 1 tablet by mouth 2 (Two) Times a Day for 10 days.  Dispense: 20 tablet; Refill: 0   -Flu and COVID swab negative.  May continue Benadryl as needed.  Ceftin 500 mg 1 tablet twice a day for 10 days.  Increase fluid intake.  We will continue to monitor.    2. Coccyx pain  -     XR Sacrum & Coccyx; Future, x-ray results negative for acute process per radiology report.  Recommended padded seat cushions and donut cushion.  We will continue to monitor.    3.  Follow-up in 1 to 2 weeks should acute symptoms fail to improve or sooner if symptoms worsen.  Otherwise, follow-up as scheduled.            This document has been electronically signed by BOWEN Vu on April 20, 2023 17:54 CDT

## 2023-04-26 ENCOUNTER — APPOINTMENT (OUTPATIENT)
Dept: PHYSICAL THERAPY | Facility: HOSPITAL | Age: 68
End: 2023-04-26
Payer: COMMERCIAL

## 2023-04-26 DIAGNOSIS — M54.2 NECK PAIN ON LEFT SIDE: Primary | ICD-10-CM

## 2023-04-26 RX ORDER — CYCLOBENZAPRINE HCL 5 MG
5 TABLET ORAL NIGHTLY PRN
Qty: 30 TABLET | Refills: 3 | Status: SHIPPED | OUTPATIENT
Start: 2023-04-26

## 2023-04-28 DIAGNOSIS — M54.2 NECK PAIN ON LEFT SIDE: Primary | ICD-10-CM

## 2023-05-05 DIAGNOSIS — M50.30 DDD (DEGENERATIVE DISC DISEASE), CERVICAL: ICD-10-CM

## 2023-05-05 DIAGNOSIS — M54.2 NECK PAIN ON LEFT SIDE: Primary | ICD-10-CM

## 2023-05-05 RX ORDER — MELOXICAM 15 MG/1
15 TABLET ORAL DAILY
Qty: 30 TABLET | Refills: 1 | Status: SHIPPED | OUTPATIENT
Start: 2023-05-05

## 2023-06-08 ENCOUNTER — OFFICE VISIT (OUTPATIENT)
Dept: FAMILY MEDICINE CLINIC | Facility: CLINIC | Age: 68
End: 2023-06-08
Payer: COMMERCIAL

## 2023-06-08 ENCOUNTER — LAB (OUTPATIENT)
Dept: LAB | Facility: HOSPITAL | Age: 68
End: 2023-06-08
Payer: COMMERCIAL

## 2023-06-08 VITALS
HEART RATE: 96 BPM | RESPIRATION RATE: 18 BRPM | SYSTOLIC BLOOD PRESSURE: 120 MMHG | HEIGHT: 72 IN | BODY MASS INDEX: 27.05 KG/M2 | DIASTOLIC BLOOD PRESSURE: 80 MMHG | OXYGEN SATURATION: 97 % | TEMPERATURE: 98.2 F | WEIGHT: 199.7 LBS

## 2023-06-08 DIAGNOSIS — E78.2 MIXED HYPERLIPIDEMIA: ICD-10-CM

## 2023-06-08 DIAGNOSIS — I10 PRIMARY HYPERTENSION: ICD-10-CM

## 2023-06-08 DIAGNOSIS — E11.9 TYPE 2 DIABETES MELLITUS WITHOUT COMPLICATION, WITHOUT LONG-TERM CURRENT USE OF INSULIN: Primary | ICD-10-CM

## 2023-06-08 NOTE — PROGRESS NOTES
Subjective   Hema Gaspar is a 67 y.o. male.     History of Present Illness  CC: Follow-up-hypertension, hyperlipidemia, diabetes  Diabetes  He presents for his follow-up diabetic visit. He has type 2 diabetes mellitus. His disease course has been stable. There are no hypoglycemic associated symptoms. Pertinent negatives for hypoglycemia include no dizziness. There are no diabetic associated symptoms. Pertinent negatives for diabetes include no chest pain, no fatigue, no polydipsia, no polyphagia, no polyuria, no weakness and no weight loss. There are no hypoglycemic complications. Symptoms are stable. There are no diabetic complications. Risk factors for coronary artery disease include family history, dyslipidemia, diabetes mellitus, male sex and sedentary lifestyle. Current diabetic treatment includes oral agent (monotherapy) (trulicity). His weight is stable. He is following a diabetic diet. He participates in exercise daily. His home blood glucose trend is fluctuating minimally. His overall blood glucose range is 110-130 mg/dl. An ACE inhibitor/angiotensin II receptor blocker is being taken.   Hypertension  This is a chronic problem. The current episode started more than 1 year ago. The problem is unchanged. The problem is controlled. Pertinent negatives include no chest pain, palpitations or shortness of breath. Risk factors for coronary artery disease include family history, dyslipidemia, diabetes mellitus and male gender. Current antihypertension treatment includes beta blockers and ACE inhibitors. The current treatment provides significant improvement. There are no compliance problems.  There is no history of angina, kidney disease or CAD/MI.   Hyperlipidemia  This is a chronic problem. The current episode started more than 1 year ago. Recent lipid tests were reviewed and are variable. Exacerbating diseases include diabetes. He has no history of obesity. Factors aggravating his hyperlipidemia  include fatty foods. Pertinent negatives include no chest pain, myalgias or shortness of breath. Current antihyperlipidemic treatment includes statins. The current treatment provides significant improvement of lipids. There are no compliance problems.  Risk factors for coronary artery disease include family history, dyslipidemia, diabetes mellitus, hypertension and male sex.      The following portions of the patient's history were reviewed and updated as appropriate: allergies, current medications, past family history, past medical history, past social history, past surgical history, and problem list.    Review of Systems   Constitutional:  Negative for activity change, appetite change, chills, fatigue, fever, unexpected weight gain and unexpected weight loss.   HENT:  Negative for congestion, sore throat, trouble swallowing and voice change.    Eyes: Negative.    Respiratory:  Negative for cough, chest tightness, shortness of breath and wheezing.    Cardiovascular:  Negative for chest pain, palpitations and leg swelling.   Gastrointestinal:  Negative for abdominal pain, diarrhea, nausea and vomiting.   Endocrine: Negative.  Negative for cold intolerance, heat intolerance, polydipsia, polyphagia and polyuria.   Genitourinary:  Negative for dysuria, hematuria and urgency.   Musculoskeletal:  Negative for arthralgias and myalgias.   Skin:  Negative for rash.   Neurological:  Negative for dizziness, weakness, light-headedness and headache.   Hematological: Negative.    Psychiatric/Behavioral: Negative.       Objective   Physical Exam  Vitals and nursing note reviewed.   Constitutional:       General: He is not in acute distress.     Appearance: Normal appearance. He is well-developed and normal weight. He is not ill-appearing, toxic-appearing or diaphoretic.   HENT:      Head: Normocephalic and atraumatic.   Eyes:      Conjunctiva/sclera: Conjunctivae normal.   Cardiovascular:      Rate and Rhythm: Normal rate and  regular rhythm.      Heart sounds: Normal heart sounds. No murmur heard.    No friction rub. No gallop.   Pulmonary:      Effort: Pulmonary effort is normal. No respiratory distress.      Breath sounds: Normal breath sounds. No stridor. No wheezing, rhonchi or rales.   Abdominal:      General: Bowel sounds are normal. There is no distension.      Palpations: Abdomen is soft. There is no mass.      Tenderness: There is no abdominal tenderness. There is no guarding or rebound.      Hernia: No hernia is present.   Musculoskeletal:         General: No tenderness. Normal range of motion.      Cervical back: Normal range of motion.   Skin:     General: Skin is warm and dry.      Coloration: Skin is not pale.      Findings: No erythema or rash.   Neurological:      Mental Status: He is alert and oriented to person, place, and time.   Psychiatric:         Mood and Affect: Mood normal.         Behavior: Behavior normal.         Thought Content: Thought content normal.         Judgment: Judgment normal.       Assessment & Plan   Diagnoses and all orders for this visit:    1. Type 2 diabetes mellitus without complication, without long-term current use of insulin (Primary)  -     Basic Metabolic Panel; Future  -     Hemoglobin A1c; Future  -     Lipid Panel; Future, controlled with no reported hyper hypoglycemia.  Tolerating medications well.  Continue diabetic diet, Trulicity, metformin as prescribed.  We will continue to monitor.    2. Mixed hyperlipidemia  -     Basic Metabolic Panel; Future  -     Hemoglobin A1c; Future  -     Lipid Panel; Future, will call with results.  Continue low-fat diet and Crestor as prescribed.  We will continue to monitor.    3. Primary hypertension   -Controlled.  Continue Lotensin, Toprol-XL as prescribed.  We will continue to monitor.    4.  Follow-up in 6 months or sooner for any acute needs.            This document has been electronically signed by BOWEN Vu on June 8, 2023  17:54 CDT

## 2023-06-09 ENCOUNTER — LAB (OUTPATIENT)
Dept: LAB | Facility: HOSPITAL | Age: 68
End: 2023-06-09
Payer: COMMERCIAL

## 2023-06-09 DIAGNOSIS — E11.9 TYPE 2 DIABETES MELLITUS WITHOUT COMPLICATION, WITHOUT LONG-TERM CURRENT USE OF INSULIN: ICD-10-CM

## 2023-06-09 DIAGNOSIS — E78.2 MIXED HYPERLIPIDEMIA: ICD-10-CM

## 2023-06-09 LAB
ANION GAP SERPL CALCULATED.3IONS-SCNC: 9 MMOL/L (ref 5–15)
BUN SERPL-MCNC: 13 MG/DL (ref 8–23)
BUN/CREAT SERPL: 15.3 (ref 7–25)
CALCIUM SPEC-SCNC: 10 MG/DL (ref 8.6–10.5)
CHLORIDE SERPL-SCNC: 101 MMOL/L (ref 98–107)
CHOLEST SERPL-MCNC: 101 MG/DL (ref 0–200)
CO2 SERPL-SCNC: 28 MMOL/L (ref 22–29)
CREAT SERPL-MCNC: 0.85 MG/DL (ref 0.76–1.27)
EGFRCR SERPLBLD CKD-EPI 2021: 95.2 ML/MIN/1.73
GLUCOSE SERPL-MCNC: 134 MG/DL (ref 65–99)
HBA1C MFR BLD: 7 % (ref 4.8–5.6)
HDLC SERPL-MCNC: 54 MG/DL (ref 40–60)
LDLC SERPL CALC-MCNC: 33 MG/DL (ref 0–100)
LDLC/HDLC SERPL: 0.64 {RATIO}
POTASSIUM SERPL-SCNC: 4.7 MMOL/L (ref 3.5–5.2)
SODIUM SERPL-SCNC: 138 MMOL/L (ref 136–145)
TRIGL SERPL-MCNC: 61 MG/DL (ref 0–150)
VLDLC SERPL-MCNC: 14 MG/DL (ref 5–40)

## 2023-06-09 PROCEDURE — 80061 LIPID PANEL: CPT

## 2023-06-09 PROCEDURE — 80048 BASIC METABOLIC PNL TOTAL CA: CPT

## 2023-06-09 PROCEDURE — 83036 HEMOGLOBIN GLYCOSYLATED A1C: CPT

## 2023-06-09 PROCEDURE — 36415 COLL VENOUS BLD VENIPUNCTURE: CPT

## 2023-06-09 NOTE — PROGRESS NOTES
Hemoglobin A1c improved to goal, 7.0.  Significant decrease in LDL.  Continue medications, diabetic diet, low-fat diet.  Follow-up as scheduled.

## 2023-08-16 RX ORDER — DULAGLUTIDE 1.5 MG/.5ML
1.5 INJECTION, SOLUTION SUBCUTANEOUS WEEKLY
Qty: 0.5 ML | Refills: 4 | Status: SHIPPED | OUTPATIENT
Start: 2023-08-16

## 2023-08-16 NOTE — TELEPHONE ENCOUNTER
Incoming Refill Request      Medication requested (name and dose): [facility]    Pharmacy where request should be sent: [facility]    Additional details provided by patient: pt is out and needs this sent in as soon as possible    Best call back number: [phone]    Does the patient have less than a 3 day supply:  [x] Yes  [] No    Deb Monique Rep  08/16/23, 09:48 CDT

## 2023-08-18 ENCOUNTER — DOCUMENTATION (OUTPATIENT)
Dept: FAMILY MEDICINE CLINIC | Facility: CLINIC | Age: 68
End: 2023-08-18
Payer: COMMERCIAL

## 2023-08-18 ENCOUNTER — TELEPHONE (OUTPATIENT)
Dept: FAMILY MEDICINE CLINIC | Facility: CLINIC | Age: 68
End: 2023-08-18
Payer: COMMERCIAL

## 2023-08-18 NOTE — PROGRESS NOTES
Prior authorization for [facility] was approved from 08/18/2023-08/18/2024. Patient/pharmacy notified.

## 2023-08-18 NOTE — TELEPHONE ENCOUNTER
Please call Dian at [facility]. She would like to speak to you about a PA on one of Mr. Gaspar's medications. Thanks!

## 2024-01-28 NOTE — PROGRESS NOTES
Hemoglobin A1c elevated at 8.1.  I suspect fluctuating blood sugars are the reason for his fatigue at times.  He needs to follow a strict diabetic diet.  If he would like to see a diabetic educator for further dietary instruction let me know and I will place a referral.  Continue current medication at this time.  Further adjustments may be necessary if A1c does not improve.  LDL elevated above goal.  I am going to increase his Crestor to 20 mg nightly.  Low-fat diet.  Labs otherwise stable.  Follow-up as scheduled. Unknown

## (undated) DEVICE — CANN SMPL SOFTECH BIFLO ETCO2 A/M 7FT